# Patient Record
Sex: FEMALE | Race: WHITE | NOT HISPANIC OR LATINO | Employment: OTHER | URBAN - METROPOLITAN AREA
[De-identification: names, ages, dates, MRNs, and addresses within clinical notes are randomized per-mention and may not be internally consistent; named-entity substitution may affect disease eponyms.]

---

## 2018-07-23 ENCOUNTER — HISTORICAL (OUTPATIENT)
Dept: ADMINISTRATIVE | Facility: HOSPITAL | Age: 73
End: 2018-07-23

## 2018-10-31 ENCOUNTER — HISTORICAL (OUTPATIENT)
Dept: RADIOLOGY | Facility: HOSPITAL | Age: 73
End: 2018-10-31

## 2018-11-08 LAB
BUN SERPL-MCNC: 16 MG/DL (ref 7–18)
CALCIUM SERPL-MCNC: 8.9 MG/DL (ref 8.5–10.1)
CHLORIDE SERPL-SCNC: 106 MMOL/L (ref 98–107)
CO2 SERPL-SCNC: 30 MMOL/L (ref 21–32)
CREAT SERPL-MCNC: 0.91 MG/DL (ref 0.55–1.02)
CREAT/UREA NIT SERPL: 18
GLUCOSE SERPL-MCNC: 137 MG/DL (ref 74–106)
POTASSIUM SERPL-SCNC: 3.3 MMOL/L (ref 3.5–5.1)
SODIUM SERPL-SCNC: 142 MMOL/L (ref 136–145)

## 2018-11-13 ENCOUNTER — HISTORICAL (OUTPATIENT)
Dept: SURGERY | Facility: HOSPITAL | Age: 73
End: 2018-11-13

## 2019-01-07 ENCOUNTER — HISTORICAL (OUTPATIENT)
Dept: ADMINISTRATIVE | Facility: HOSPITAL | Age: 74
End: 2019-01-07

## 2019-06-17 ENCOUNTER — HISTORICAL (OUTPATIENT)
Dept: RESPIRATORY THERAPY | Facility: HOSPITAL | Age: 74
End: 2019-06-17

## 2019-06-25 ENCOUNTER — HISTORICAL (OUTPATIENT)
Dept: RADIOLOGY | Facility: HOSPITAL | Age: 74
End: 2019-06-25

## 2021-12-07 ENCOUNTER — HISTORICAL (OUTPATIENT)
Dept: ADMINISTRATIVE | Facility: HOSPITAL | Age: 76
End: 2021-12-07

## 2021-12-15 ENCOUNTER — HISTORICAL (OUTPATIENT)
Dept: RADIOLOGY | Facility: HOSPITAL | Age: 76
End: 2021-12-15

## 2022-04-07 ENCOUNTER — HISTORICAL (OUTPATIENT)
Dept: ADMINISTRATIVE | Facility: HOSPITAL | Age: 77
End: 2022-04-07
Payer: MEDICARE

## 2022-04-24 VITALS
WEIGHT: 200 LBS | BODY MASS INDEX: 32.14 KG/M2 | SYSTOLIC BLOOD PRESSURE: 148 MMHG | DIASTOLIC BLOOD PRESSURE: 88 MMHG | HEIGHT: 66 IN | OXYGEN SATURATION: 98 %

## 2022-04-27 RX ORDER — AMLODIPINE BESYLATE 5 MG/1
5 TABLET ORAL DAILY
Status: ON HOLD | COMMUNITY
Start: 2021-12-07 | End: 2022-05-03 | Stop reason: ALTCHOICE

## 2022-04-27 RX ORDER — OMEPRAZOLE 40 MG/1
40 CAPSULE, DELAYED RELEASE ORAL DAILY
COMMUNITY

## 2022-04-27 RX ORDER — ROSUVASTATIN CALCIUM 40 MG/1
10 TABLET, COATED ORAL NIGHTLY
COMMUNITY

## 2022-04-28 NOTE — OP NOTE
Patient:   Minda Bright            MRN: [id number]            FIN: [id number]               Age:   73 years     Sex:  Female     :  1945   Associated Diagnoses:   None   Author:   Donald TORRES MD, Giles JONES      SURGEON: Giles Bennett MD    PREOPERATIVE DIAGNOSIS: Right shoulder rotator cuff tear, SLAP tear, Impingement  POSTOPERATIVE DIAGNOSIS: Right shoulder rotator cuff tear, SLAP tear, Impingement    PROCEDURE PERFORMED:   1. Right shoulder arthroscopic rotator cuff repair  2. Right shoulder arthroscopic biceps tenotomy  3. Right shoulder arthroscopic subacromial decompression  4. Right shoulder PRP injection    ANESTHESIA: General plus regional    ESTIMATED BLOOD LOSS: Minimal.    COMPLICATIONS: None.    IMPLANTS:    1. Medial Row: Arthrex SwiveLock 4.75mm Anchors   2. Lateral Row: Arthrex SwiveLock 5.5mm Anchors     INDICATIONS FOR PROCEDURE: Minda is a 73 y.o. female who has had ongoing right shoulder pain and weakness. The patient was seen in the clinic and preoperative imaging has revealed a torn rotator cuff. The patient has failed nonoperative treatment and has elected for operative intervention. Risks and benefits were thoroughly explained and consent was obtained prior to today's procedure.    DESCRIPTION OF PROCEDURE: The patient was seen in the preoperative holding area where the history and physical were reviewed without change. The operative shoulder was marked, consents were reviewed, and any questions were answered for the patient. A regional blockade of the shoulder was performed by the Anesthesia Service. The patient was induced under general anesthesia. Next the patient was placed upright into the beachchair position with care taken to ensure the cervical spine was well positioned and the face, eyes and all bony prominences well protected. Preoperative time-out led by the surgeon was performed verifying the patient, procedure, and preoperative antibiotics. The right upper  extremity was then prepped and draped in the usual sterile fashion and secured to an arm britt.    A standard posterior portal was established with a #11-blade.  The arthroscope was inserted into the glenohumeral joint atraumatically. The joint was insufflated with arthroscopic fluid. We then made a standard anterior portal using an #18-gauge spinal needle for guidance. An arthroscopic probe was inserted through the anterior portal and diagnostic arthroscopy begun.    This revealed a intact biceps tendon with longitudinal tearing. A torn superior labrum. An intact anterior, inferior and posterior labrum. The articular cartilage of the humeral head was intact. The articular cartilage of the glenoid was intact. The subscapularis tendon was intact. The articular side of the supraspinatus tendon was torn. The articular side of the infraspinatus tendon was intact.    We then used our arthroscopic punch to perform a tenotomy of the biceps tendon. We used arthroscopic shaver to debride any remnant biceps from the superior labrum. The biceps tendon was then allowed to retract to the bicipital groove anteriorly.    We identified the full thickness rotator cuff tear which involved the supraspinatus tendons. The tear was of U type pattern, and the quality of the tissue was good. We identified the undersurface of the acromion. We used a VAPR to debride the undersurface of the acromion up to the anterior and lateral margins. We identified the CA ligament and reflected it off the acromion. We continued debridement of the bursal tissue, identified the rotator cuff tear, and worked to define the edges more clearly. The remnants of rotator cuff tissue at the greater tuberosity was debrided and the greater tuberosity was debrided down to bleeding bone. We used the liberator VAPR to release adhesions both superiorly and inferiorly above and below the rotator cuff. After we had done this, it was able to mobilize the quite well. We  began the repair once we had our tissue appropriately mobilized. We placed a 2 anchors adjacent to the articular surface. We passed suture tapes anterior and posterior through the torn tendon. I then split the suture tape loop into 2 seperate strands.     Next, we began debridement of the lateral humerus with a VAPR probe. We debrided down to bony base. At this time, we then brought out 1 suture from each medial tape into 2 lateral row anchors. These were brought down tightly and had excellent purchase of the lateral cortex. We were very satisfied with the repair.    Next, we did our acromioplasty by burring the anterolateral margin of the acromion then working carefully medially. The portals were switched and the acromioplasty was complated through the posterior portal in the cutting block fashion.     Under arthroscopic visualization, I then placed a spinal needle into the tendon bone repair interface.  I injected 4 cc of PRP into the area.    Once we completed this, we took the remaining final arthroscopic pictures. We then removed our instruments, closed the portals with #3-0 monocryl suture. Sterile dressings were applied. The patient was then placed in an abduction pillow and sling, awoken from general anesthetic, and taken to recovery room in a stable condition.

## 2022-05-03 ENCOUNTER — HOSPITAL ENCOUNTER (OUTPATIENT)
Facility: HOSPITAL | Age: 77
Discharge: HOME | End: 2022-05-03
Attending: INTERNAL MEDICINE | Admitting: INTERNAL MEDICINE
Payer: MEDICARE

## 2022-05-03 VITALS
HEIGHT: 66 IN | BODY MASS INDEX: 30.36 KG/M2 | HEART RATE: 71 BPM | OXYGEN SATURATION: 98 % | DIASTOLIC BLOOD PRESSURE: 86 MMHG | WEIGHT: 188.94 LBS | TEMPERATURE: 98 F | SYSTOLIC BLOOD PRESSURE: 156 MMHG

## 2022-05-03 DIAGNOSIS — R07.9 CHEST PAIN: ICD-10-CM

## 2022-05-03 DIAGNOSIS — R93.1 ABNORMAL ECHOCARDIOGRAM: ICD-10-CM

## 2022-05-03 DIAGNOSIS — R06.02 SHORTNESS OF BREATH: ICD-10-CM

## 2022-05-03 DIAGNOSIS — R94.8 NONSPECIFIC ABNORMAL RESULTS OF BASAL METABOLISM FUNCTION STUDY: ICD-10-CM

## 2022-05-03 PROCEDURE — C1769 GUIDE WIRE: HCPCS | Performed by: INTERNAL MEDICINE

## 2022-05-03 PROCEDURE — 93005 ELECTROCARDIOGRAM TRACING: CPT

## 2022-05-03 PROCEDURE — 93458 L HRT ARTERY/VENTRICLE ANGIO: CPT | Performed by: INTERNAL MEDICINE

## 2022-05-03 PROCEDURE — 93010 EKG 12-LEAD: ICD-10-PCS | Mod: XE,,, | Performed by: INTERNAL MEDICINE

## 2022-05-03 PROCEDURE — 63600175 PHARM REV CODE 636 W HCPCS: Performed by: INTERNAL MEDICINE

## 2022-05-03 PROCEDURE — 25000003 PHARM REV CODE 250: Performed by: INTERNAL MEDICINE

## 2022-05-03 PROCEDURE — 27201423 OPTIME MED/SURG SUP & DEVICES STERILE SUPPLY: Performed by: INTERNAL MEDICINE

## 2022-05-03 PROCEDURE — C1894 INTRO/SHEATH, NON-LASER: HCPCS | Performed by: INTERNAL MEDICINE

## 2022-05-03 PROCEDURE — 25000003 PHARM REV CODE 250

## 2022-05-03 PROCEDURE — 93010 ELECTROCARDIOGRAM REPORT: CPT | Mod: XE,,, | Performed by: INTERNAL MEDICINE

## 2022-05-03 PROCEDURE — C1887 CATHETER, GUIDING: HCPCS | Performed by: INTERNAL MEDICINE

## 2022-05-03 RX ORDER — CLOPIDOGREL 300 MG/1
300 TABLET, FILM COATED ORAL ONCE
COMMUNITY
End: 2022-05-03

## 2022-05-03 RX ORDER — HEPARIN SODIUM 1000 [USP'U]/ML
INJECTION, SOLUTION INTRAVENOUS; SUBCUTANEOUS
Status: DISCONTINUED | OUTPATIENT
Start: 2022-05-03 | End: 2022-05-03 | Stop reason: HOSPADM

## 2022-05-03 RX ORDER — FENTANYL CITRATE 50 UG/ML
INJECTION, SOLUTION INTRAMUSCULAR; INTRAVENOUS
Status: DISCONTINUED | OUTPATIENT
Start: 2022-05-03 | End: 2022-05-03 | Stop reason: HOSPADM

## 2022-05-03 RX ORDER — ACETAMINOPHEN 325 MG/1
650 TABLET ORAL EVERY 4 HOURS PRN
Status: DISCONTINUED | OUTPATIENT
Start: 2022-05-03 | End: 2022-05-03 | Stop reason: HOSPADM

## 2022-05-03 RX ORDER — SODIUM CHLORIDE 0.9 % (FLUSH) 0.9 %
10 SYRINGE (ML) INJECTION
Status: ACTIVE | OUTPATIENT
Start: 2022-05-03

## 2022-05-03 RX ORDER — DIAZEPAM 5 MG/1
TABLET ORAL
Status: COMPLETED
Start: 2022-05-03 | End: 2022-05-03

## 2022-05-03 RX ORDER — VERAPAMIL HYDROCHLORIDE 2.5 MG/ML
INJECTION, SOLUTION INTRAVENOUS
Status: DISCONTINUED | OUTPATIENT
Start: 2022-05-03 | End: 2022-05-03 | Stop reason: HOSPADM

## 2022-05-03 RX ORDER — DIAZEPAM 5 MG/1
10 TABLET ORAL
Status: DISCONTINUED | OUTPATIENT
Start: 2022-05-03 | End: 2022-05-03 | Stop reason: HOSPADM

## 2022-05-03 RX ORDER — DIAZEPAM 5 MG/1
10 TABLET ORAL ONCE
Status: DISCONTINUED | OUTPATIENT
Start: 2022-05-03 | End: 2022-05-03

## 2022-05-03 RX ORDER — SODIUM CHLORIDE 9 MG/ML
INJECTION, SOLUTION INTRAVENOUS ONCE
Status: COMPLETED | OUTPATIENT
Start: 2022-05-03 | End: 2022-05-03

## 2022-05-03 RX ORDER — DIPHENHYDRAMINE HCL 25 MG
50 CAPSULE ORAL
Status: DISPENSED | OUTPATIENT
Start: 2022-05-03

## 2022-05-03 RX ORDER — NITROGLYCERIN 20 MG/100ML
INJECTION INTRAVENOUS
Status: DISCONTINUED | OUTPATIENT
Start: 2022-05-03 | End: 2022-05-03 | Stop reason: HOSPADM

## 2022-05-03 RX ORDER — LIDOCAINE HYDROCHLORIDE 20 MG/ML
INJECTION, SOLUTION EPIDURAL; INFILTRATION; INTRACAUDAL; PERINEURAL
Status: DISCONTINUED | OUTPATIENT
Start: 2022-05-03 | End: 2022-05-03 | Stop reason: HOSPADM

## 2022-05-03 RX ORDER — METOPROLOL SUCCINATE 25 MG/1
25 TABLET, EXTENDED RELEASE ORAL NIGHTLY
COMMUNITY
End: 2023-09-25 | Stop reason: DRUGHIGH

## 2022-05-03 RX ORDER — CHOLECALCIFEROL (VITAMIN D3) 25 MCG
1000 TABLET ORAL NIGHTLY
COMMUNITY

## 2022-05-03 RX ORDER — MULTIVITAMIN
1 TABLET ORAL DAILY
COMMUNITY

## 2022-05-03 RX ORDER — MIDAZOLAM HYDROCHLORIDE 2 MG/2ML
INJECTION, SOLUTION INTRAMUSCULAR; INTRAVENOUS
Status: DISCONTINUED | OUTPATIENT
Start: 2022-05-03 | End: 2022-05-03 | Stop reason: HOSPADM

## 2022-05-03 RX ORDER — ASPIRIN 81 MG/1
81 TABLET ORAL DAILY
COMMUNITY

## 2022-05-03 RX ORDER — ASCORBIC ACID 500 MG
500 TABLET ORAL NIGHTLY
COMMUNITY

## 2022-05-03 RX ORDER — VITAMIN E 268 MG
400 CAPSULE ORAL DAILY
COMMUNITY

## 2022-05-03 RX ORDER — ONDANSETRON 4 MG/1
8 TABLET, ORALLY DISINTEGRATING ORAL EVERY 8 HOURS PRN
Status: DISCONTINUED | OUTPATIENT
Start: 2022-05-03 | End: 2022-05-03 | Stop reason: HOSPADM

## 2022-05-03 RX ADMIN — DIAZEPAM 10 MG: 5 TABLET ORAL at 01:05

## 2022-05-03 RX ADMIN — SODIUM CHLORIDE: 9 INJECTION, SOLUTION INTRAVENOUS at 01:05

## 2022-05-03 RX ADMIN — DIPHENHYDRAMINE HYDROCHLORIDE 50 MG: 25 CAPSULE ORAL at 01:05

## 2022-05-03 NOTE — Clinical Note
The DP pulses were 1+ bilaterally. The left radial pulse was 2+. The right radial pulse was +2 and allens test positive.

## 2022-05-03 NOTE — PROGRESS NOTES
Tr band removed; no bleeding noted, no heamtoma noted; quik clot and tegaderm applied and arm board reapplied to right arm

## 2022-05-03 NOTE — Clinical Note
The radial band was applied to the right radial artery. 13 cc's of air were inserted into the closure device.

## 2022-05-03 NOTE — Clinical Note
The catheter was repositioned into the ostium   left main. An angiography was performed of the left coronary arteries.

## 2022-05-03 NOTE — Clinical Note
The groin and right radial was prepped. The site was prepped with ChloraPrep. The site was clipped. The patient was draped. The patient was positioned supine. The patient was secured to an armboard.

## 2022-06-14 NOTE — DISCHARGE SUMMARY
[facility] - Cath Lab Services  Discharge Note  Short Stay    Procedure(s) (LRB):  CATHETERIZATION, HEART, LEFT (Left)    OUTCOME: Patient tolerated treatment/procedure well without complication and is now ready for discharge.    DISPOSITION: Home or Self Care    FINAL DIAGNOSIS:  <principal problem not specified>    FOLLOWUP: In clinic    DISCHARGE INSTRUCTIONS:    Discharge Procedure Orders   Diet general         Clinical Reference Documents Added to Patient Instructions       Document    MODERATE SEDATION IN ADULTS DISCHARGE INSTRUCTIONS (ENGLISH)    WOUND CARE FOR ARTERIAL PUNCTURE DISCHARGE INSTRUCTIONS (ENGLISH)          TIME SPENT ON DISCHARGE: 30 minutes

## 2023-09-25 ENCOUNTER — HOSPITAL ENCOUNTER (OUTPATIENT)
Facility: HOSPITAL | Age: 78
Discharge: HOME HEALTH | End: 2023-09-26
Attending: STUDENT IN AN ORGANIZED HEALTH CARE EDUCATION/TRAINING PROGRAM | Admitting: STUDENT IN AN ORGANIZED HEALTH CARE EDUCATION/TRAINING PROGRAM
Payer: MEDICARE

## 2023-09-25 DIAGNOSIS — H53.9 TRANSIENT VISUAL DISTURBANCE, RIGHT: Primary | ICD-10-CM

## 2023-09-25 DIAGNOSIS — G45.9 TIA (TRANSIENT ISCHEMIC ATTACK): ICD-10-CM

## 2023-09-25 LAB
ALBUMIN SERPL-MCNC: 3.8 G/DL (ref 3.4–4.8)
ALBUMIN/GLOB SERPL: 1.2 RATIO (ref 1.1–2)
ALP SERPL-CCNC: 65 UNIT/L (ref 40–150)
ALT SERPL-CCNC: 9 UNIT/L (ref 0–55)
AST SERPL-CCNC: 21 UNIT/L (ref 5–34)
BASOPHILS # BLD AUTO: 0.03 X10(3)/MCL
BASOPHILS NFR BLD AUTO: 0.8 %
BILIRUB SERPL-MCNC: 0.5 MG/DL
BUN SERPL-MCNC: 10.9 MG/DL (ref 9.8–20.1)
CALCIUM SERPL-MCNC: 9.7 MG/DL (ref 8.4–10.2)
CHLORIDE SERPL-SCNC: 105 MMOL/L (ref 98–107)
CHOLEST SERPL-MCNC: 139 MG/DL
CHOLEST/HDLC SERPL: 3 {RATIO} (ref 0–5)
CO2 SERPL-SCNC: 29 MMOL/L (ref 23–31)
CREAT SERPL-MCNC: 0.82 MG/DL (ref 0.55–1.02)
EOSINOPHIL # BLD AUTO: 0.27 X10(3)/MCL (ref 0–0.9)
EOSINOPHIL NFR BLD AUTO: 7.2 %
ERYTHROCYTE [DISTWIDTH] IN BLOOD BY AUTOMATED COUNT: 13.1 % (ref 11.5–17)
GFR SERPLBLD CREATININE-BSD FMLA CKD-EPI: >60 MLS/MIN/1.73/M2
GLOBULIN SER-MCNC: 3.1 GM/DL (ref 2.4–3.5)
GLUCOSE SERPL-MCNC: 121 MG/DL (ref 82–115)
HCT VFR BLD AUTO: 38.4 % (ref 37–47)
HDLC SERPL-MCNC: 51 MG/DL (ref 35–60)
HGB BLD-MCNC: 12.5 G/DL (ref 12–16)
IMM GRANULOCYTES # BLD AUTO: 0.01 X10(3)/MCL (ref 0–0.04)
IMM GRANULOCYTES NFR BLD AUTO: 0.3 %
INR PPP: 1.1
LDLC SERPL CALC-MCNC: 71 MG/DL (ref 50–140)
LYMPHOCYTES # BLD AUTO: 1.06 X10(3)/MCL (ref 0.6–4.6)
LYMPHOCYTES NFR BLD AUTO: 28.3 %
MCH RBC QN AUTO: 28.9 PG (ref 27–31)
MCHC RBC AUTO-ENTMCNC: 32.6 G/DL (ref 33–36)
MCV RBC AUTO: 88.9 FL (ref 80–94)
MONOCYTES # BLD AUTO: 0.3 X10(3)/MCL (ref 0.1–1.3)
MONOCYTES NFR BLD AUTO: 8 %
NEUTROPHILS # BLD AUTO: 2.07 X10(3)/MCL (ref 2.1–9.2)
NEUTROPHILS NFR BLD AUTO: 55.4 %
NRBC BLD AUTO-RTO: 0 %
PLATELET # BLD AUTO: 183 X10(3)/MCL (ref 130–400)
PMV BLD AUTO: 11.9 FL (ref 7.4–10.4)
POCT GLUCOSE: 120 MG/DL (ref 70–110)
POTASSIUM SERPL-SCNC: 3.3 MMOL/L (ref 3.5–5.1)
PROT SERPL-MCNC: 6.9 GM/DL (ref 5.8–7.6)
PROTHROMBIN TIME: 13.8 SECONDS (ref 12.5–14.5)
RBC # BLD AUTO: 4.32 X10(6)/MCL (ref 4.2–5.4)
SODIUM SERPL-SCNC: 143 MMOL/L (ref 136–145)
TRIGL SERPL-MCNC: 86 MG/DL (ref 37–140)
TSH SERPL-ACNC: 3.89 UIU/ML (ref 0.35–4.94)
VLDLC SERPL CALC-MCNC: 17 MG/DL
WBC # SPEC AUTO: 3.74 X10(3)/MCL (ref 4.5–11.5)

## 2023-09-25 PROCEDURE — 84443 ASSAY THYROID STIM HORMONE: CPT

## 2023-09-25 PROCEDURE — 25000003 PHARM REV CODE 250

## 2023-09-25 PROCEDURE — 85025 COMPLETE CBC W/AUTO DIFF WBC: CPT

## 2023-09-25 PROCEDURE — 82962 GLUCOSE BLOOD TEST: CPT

## 2023-09-25 PROCEDURE — 93005 ELECTROCARDIOGRAM TRACING: CPT

## 2023-09-25 PROCEDURE — 93010 EKG 12-LEAD: ICD-10-PCS | Mod: ,,, | Performed by: INTERNAL MEDICINE

## 2023-09-25 PROCEDURE — 93010 ELECTROCARDIOGRAM REPORT: CPT | Mod: ,,, | Performed by: INTERNAL MEDICINE

## 2023-09-25 PROCEDURE — 96365 THER/PROPH/DIAG IV INF INIT: CPT | Mod: 59

## 2023-09-25 PROCEDURE — 80061 LIPID PANEL: CPT

## 2023-09-25 PROCEDURE — G0378 HOSPITAL OBSERVATION PER HR: HCPCS

## 2023-09-25 PROCEDURE — 85610 PROTHROMBIN TIME: CPT

## 2023-09-25 PROCEDURE — 99285 EMERGENCY DEPT VISIT HI MDM: CPT | Mod: 25

## 2023-09-25 PROCEDURE — 25500020 PHARM REV CODE 255: Performed by: STUDENT IN AN ORGANIZED HEALTH CARE EDUCATION/TRAINING PROGRAM

## 2023-09-25 PROCEDURE — 63600175 PHARM REV CODE 636 W HCPCS

## 2023-09-25 PROCEDURE — 80053 COMPREHEN METABOLIC PANEL: CPT

## 2023-09-25 PROCEDURE — 96372 THER/PROPH/DIAG INJ SC/IM: CPT | Mod: 59

## 2023-09-25 RX ORDER — METOPROLOL SUCCINATE 50 MG/1
50 TABLET, EXTENDED RELEASE ORAL DAILY
COMMUNITY
Start: 2023-09-15

## 2023-09-25 RX ORDER — AMLODIPINE BESYLATE 10 MG/1
10 TABLET ORAL DAILY
COMMUNITY
Start: 2023-09-06

## 2023-09-25 RX ORDER — EZETIMIBE 10 MG/1
10 TABLET ORAL DAILY
COMMUNITY
Start: 2023-09-06

## 2023-09-25 RX ORDER — AMLODIPINE BESYLATE 5 MG/1
10 TABLET ORAL DAILY
Status: DISCONTINUED | OUTPATIENT
Start: 2023-09-26 | End: 2023-09-26 | Stop reason: HOSPADM

## 2023-09-25 RX ORDER — LABETALOL HYDROCHLORIDE 5 MG/ML
10 INJECTION, SOLUTION INTRAVENOUS
Status: DISCONTINUED | OUTPATIENT
Start: 2023-09-25 | End: 2023-09-26 | Stop reason: HOSPADM

## 2023-09-25 RX ORDER — SODIUM CHLORIDE 0.9 % (FLUSH) 0.9 %
10 SYRINGE (ML) INJECTION
Status: DISCONTINUED | OUTPATIENT
Start: 2023-09-25 | End: 2023-09-26 | Stop reason: HOSPADM

## 2023-09-25 RX ORDER — POTASSIUM CHLORIDE 7.45 MG/ML
10 INJECTION INTRAVENOUS ONCE
Status: COMPLETED | OUTPATIENT
Start: 2023-09-25 | End: 2023-09-25

## 2023-09-25 RX ORDER — ATORVASTATIN CALCIUM 40 MG/1
40 TABLET, FILM COATED ORAL DAILY
Status: DISCONTINUED | OUTPATIENT
Start: 2023-09-25 | End: 2023-09-26 | Stop reason: HOSPADM

## 2023-09-25 RX ORDER — EZETIMIBE 10 MG/1
10 TABLET ORAL DAILY
Status: DISCONTINUED | OUTPATIENT
Start: 2023-09-26 | End: 2023-09-26 | Stop reason: HOSPADM

## 2023-09-25 RX ORDER — ONDANSETRON 2 MG/ML
4 INJECTION INTRAMUSCULAR; INTRAVENOUS EVERY 8 HOURS PRN
Status: DISCONTINUED | OUTPATIENT
Start: 2023-09-25 | End: 2023-09-26 | Stop reason: HOSPADM

## 2023-09-25 RX ORDER — ASPIRIN 81 MG/1
81 TABLET ORAL DAILY
Status: DISCONTINUED | OUTPATIENT
Start: 2023-09-25 | End: 2023-09-26 | Stop reason: HOSPADM

## 2023-09-25 RX ORDER — METOPROLOL SUCCINATE 50 MG/1
50 TABLET, EXTENDED RELEASE ORAL DAILY
Status: DISCONTINUED | OUTPATIENT
Start: 2023-09-26 | End: 2023-09-26 | Stop reason: HOSPADM

## 2023-09-25 RX ORDER — ENOXAPARIN SODIUM 100 MG/ML
40 INJECTION SUBCUTANEOUS EVERY 24 HOURS
Status: DISCONTINUED | OUTPATIENT
Start: 2023-09-25 | End: 2023-09-26 | Stop reason: HOSPADM

## 2023-09-25 RX ADMIN — POTASSIUM CHLORIDE 10 MEQ: 10 INJECTION, SOLUTION INTRAVENOUS at 05:09

## 2023-09-25 RX ADMIN — ATORVASTATIN CALCIUM 40 MG: 40 TABLET, FILM COATED ORAL at 05:09

## 2023-09-25 RX ADMIN — ASPIRIN 81 MG: 81 TABLET, COATED ORAL at 05:09

## 2023-09-25 RX ADMIN — IOPAMIDOL 100 ML: 755 INJECTION, SOLUTION INTRAVENOUS at 05:09

## 2023-09-25 RX ADMIN — ENOXAPARIN SODIUM 40 MG: 40 INJECTION SUBCUTANEOUS at 05:09

## 2023-09-25 NOTE — DISCHARGE INSTRUCTIONS
Thanks for letting us take care of you today!  It is our goal to give you courteous care and to keep you comfortable and informed, if you have any questions before you leave I will be happy to try and answer them.    Here is some advice after your visit:    Your visit in the emergency department is NOT definitive care - please follow-up with your primary care doctor and/or specialist within 1-2 days. Please return to the emergency department if you develop worsening symptoms including: fever, chills, chest pain, shortness of breath, weakness, numbness, tingling, nausea, vomiting, inability to eat, drink, or take your medication. Please return if you have any worsening in your condition or if you have any other concerns.    If you had radiology exams like an XRAY or CT in the emergency Department the interpreation on them may be preliminary - there may be less time sensitive findings on the reports please obtain these reports within 24 hours from the hospital or by using your out on your mobile phone to access records.  Bring these to your primary care doctor and/or specialist for further review of incidental findings.    Please review any LAB WORK from your visit today with your primary care physician.    Please be sure to follow up with the primary care physician.  You can let them know the emergency department he was concerned you possibly had a TIA.  This needs to be worked up more.    Please return emergency department immediately if you have any return of or worsening symptoms.

## 2023-09-25 NOTE — FIRST PROVIDER EVALUATION
Medical screening examination initiated.  I have conducted a focused provider triage encounter, findings are as follows:    Brief history of present illness:  arrived to ED due to R eye blurriness that began around 0830 today. Symptoms have since resolved. Denies hx of CVA.     Vitals:    09/25/23 1102   BP: (!) 171/91   BP Location: Right arm   Pulse: 99   Resp: 19   SpO2: 95%   Weight: 88.6 kg (195 lb 5.2 oz)       Pertinent physical exam:  ambulatory into triage, awake, alert, no slurred speech, facial droop, or extremity weakness noted.     Brief workup plan:  labs, visual acuity, & CT     Preliminary workup initiated; this workup will be continued and followed by the physician or advanced practice provider that is assigned to the patient when roomed.

## 2023-09-25 NOTE — H&P
[facility] Hospital Medicine History & Physical Examination       Patient Name: Minda Bright  MRN: [id number]  Patient Class: OP- Observation   Admission Date: 9/25/2023   Admitting Physician: EVELYN Service   Length of Stay: 0  Attending Physician: Dr. Jameson   Primary Care Provider: Td Bolden MD  Face-to-Face encounter date: 09/25/2023  Code Status:Full   Chief Complaint: Blurred Vision (Pt sent by  for blurry vision in R eye/nausea about 0830 this AM. Blurred vision resolved PTA. No drift, equal strength bilaterally, no facial droop/speech changes. Hx HTN, dose increased recently. Visual acuity charted. ) and Nausea    Patient information was obtained from patient, patient's family, past medical records and ER records.  ED records were reviewed in detail and documented below    HISTORY OF PRESENT ILLNESS:   Minda Bright is a 78 y.o. female who past medical history of HTN and HLD known to [facility] presented to [facility] on 9/25/2023 with a primary complaint of right eye blurry vision and left upper extremity numbness lasting 5 minutes that started this morning at 8:30 a.m. while riding down the road with her .  The patient explains she was scheduled for routine lab work this morning with her PCP and while driving down the road home, she developed the mentioned symptoms.  She states they resolved in about 5 minutes.  She states she called her PCP who recommended ER evaluation.  The patient explains that she does not have a history of TIA, CVA, or MI/cardiac stents.  The patient denies any unilateral weakness, dysarthria, facial asymmetry, dysphagia, or headaches.  Patient also denies any chest pain, shortness a breath, cough, abdominal pain, nausea, vomiting, diarrhea, dysuria, bleeding symptoms, syncope, falls, any injuries.    ED course: Vital signs revealed the patient is afebrile, elevated /91 improved to 150 9/78, other vital signs stable on room air.   Labs with unremarkable CBC, unremarkable PT/INR, chemistry with mildly decreased potassium at 3.3.  Normal TSH.  CT head without contrast showed no acute findings.  EKG with sinus rhythm 81 beats per minute and no STEMI criteria.    Patient admitted for TIA      PAST MEDICAL HISTORY:     Past Medical History:   Diagnosis Date    Anxiety disorder, unspecified     Asthma     COPD (chronic obstructive pulmonary disease)     Hyperlipidemia     Hypertension     Obesity, unspecified        PAST SURGICAL HISTORY:     Past Surgical History:   Procedure Laterality Date    BACK SURGERY      BREAST LUMPECTOMY Right     LEFT HEART CATHETERIZATION Left 5/3/2022    Procedure: CATHETERIZATION, HEART, LEFT;  Surgeon: Jaskaran Kevin MD;  Location: [facility] CATH LAB;  Service: Cardiology;  Laterality: Left;  LHC +/- PCI VIA RRA    ROTATOR CUFF REPAIR Right     TONSILLECTOMY      TOTAL KNEE ARTHROPLASTY Right        ALLERGIES:   Arb-angiotensin receptor antagonist    FAMILY HISTORY:   Reviewed and negative    SOCIAL HISTORY:     Social History     Tobacco Use    Smoking status: Never    Smokeless tobacco: Never   Substance Use Topics    Alcohol use: Never        HOME MEDICATIONS:     Prior to Admission medications    Medication Sig Start Date End Date Taking? Authorizing Provider   amLODIPine (NORVASC) 10 MG tablet Take 10 mg by mouth once daily. 9/6/23  Yes Provider, Historical   ascorbic acid, vitamin C, (VITAMIN C) 500 MG tablet Take 500 mg by mouth every evening.   Yes Provider, Historical   aspirin (ECOTRIN) 81 MG EC tablet Take 81 mg by mouth once daily.   Yes Provider, Historical   ezetimibe (ZETIA) 10 mg tablet Take 10 mg by mouth once daily. 9/6/23  Yes Provider, Historical   metoprolol succinate (TOPROL-XL) 50 MG 24 hr tablet Take 50 mg by mouth once daily. 9/15/23  Yes Provider, Historical   multivitamin (ONE DAILY MULTIVITAMIN) per tablet Take 1 tablet by mouth once daily.   Yes Provider, Historical   omeprazole (PRILOSEC)  40 MG capsule Take 40 mg by mouth once daily.   Yes Provider, Historical   rosuvastatin (CRESTOR) 40 MG Tab Take 10 mg by mouth every evening.   Yes Provider, Historical   vitamin D (VITAMIN D3) 1000 units Tab Take 1,000 Units by mouth every evening.   Yes Provider, Historical   vitamin E 400 UNIT capsule Take 400 Units by mouth once daily.   Yes Provider, Historical   clopidogreL (PLAVIX) 300 mg Tab Take 300 mg by mouth once.  5/3/22  Provider, Historical   metoprolol succinate (TOPROL-XL) 25 MG 24 hr tablet Take 25 mg by mouth every evening.  9/25/23  Provider, Historical       REVIEW OF SYSTEMS:   Except as documented, all other systems reviewed and negative     PHYSICAL EXAM:     VITAL SIGNS: 24 HRS MIN & MAX LAST   No data recorded     BP  Min: 159/78  Max: 171/91 (!) 159/78   Pulse  Min: 88  Max: 99  88   Resp  Min: 18  Max: 19 18   SpO2  Min: 95 %  Max: 96 % 96 %       General appearance: Well-developed, well-nourished female in no apparent distress.  HENT: Atraumatic head. Moist mucous membranes of oral cavity.  Eyes: Normal extraocular movements.   Neck: Supple.   Lungs: Clear to auscultation bilaterally. No wheezing present.  Stable on room air.  Heart: Regular rate and rhythm. S1 and S2 present with no murmurs/gallop/rub. No pedal edema. No JVD present.   Abdomen: Soft, non-distended, non-tender. No rebound tenderness/guarding. Bowel sounds are normal.   Extremities: No cyanosis, clubbing, or edema.  Skin: No Rash.   Neuro: Motor and sensory exams grossly intact. Good tone. Muscle strength 5/5 in all 4 extremities.  Nonfocal exam.  AAO x4.  No unilateral weakness, no pronator drift, no facial asymmetry, no dysarthria.  Sensation is intact distally in all extremities.  Psych/mental status: Appropriate mood and affect. Responds appropriately to questions.     LABS AND IMAGING:     Recent Labs   Lab 09/25/23  1153   WBC 3.74*   RBC 4.32   HGB 12.5   HCT 38.4   MCV 88.9   MCH 28.9   MCHC 32.6*   RDW 13.1       MPV 11.9*       Recent Labs   Lab 09/25/23  1153      K 3.3*   CO2 29   BUN 10.9   CREATININE 0.82   CALCIUM 9.7   ALBUMIN 3.8   ALKPHOS 65   ALT 9   AST 21   BILITOT 0.5       Microbiology Results (last 7 days)       ** No results found for the last 168 hours. **             CT Head Without Contrast  Narrative: EXAMINATION:  CT HEAD WITHOUT CONTRAST    CLINICAL HISTORY:  Neuro deficit, acute, stroke suspected;    TECHNIQUE:  CT images of the head without IV contrast. Axial, coronal and sagittal images reviewed. Dose length product 936 mGycm. Automatic exposure control, adjustment of mA/kV or iterative reconstruction technique used to limit radiation dose.    COMPARISON:  No relevant comparison studies available at the time of dictation.    FINDINGS:  Extra-axial spaces/ventricular system: Normal for age.    Intracranial hemorrhage: None identified.    Cerebral parenchyma: No acute large vessel territory infarct or mass effect identified.    Vascular system: No hyperdense vessel appreciated. Carotid siphon and distal vertebral artery calcifications.    Cerebellum: Normal.    Sella: Normal.    Included paranasal sinuses and mastoid air cells: Mucosal thickening in the maxillary sinuses with small volume retained secretions.  Partial opacification of the ethmoid air cells and sphenoid sinuses.    Visualized orbits: Normal.    Scalp/Calvarium: No depressed skull fracture.  Impression: No acute intracranial process identified.    Electronically signed by: Milan Shipman  Date:    09/25/2023  Time:    11:47        ASSESSMENT & PLAN:     TIA   Hypokalemia, mild  Essential hypertension  Hypercholesterolemia   GERD     -Consider neurology consultation   -Consider cardiology consultation   -Ordered brain MRI  -Order CTA of brain and neck  -Ordered TTE with bubble study   -Continue aspirin and statin   -Order PT/OT/speech consultations   -Resumed appropriate home medications   -Ordered fall  precautions  -Ordered neuro checks   -Ordered potassium supplements  -A.m. labs are CBC, CMP, magnesium, phosphorus, hemoglobin A1c, and lipid panel    VTE Prophylaxis: will be placed on Lovenox for DVT prophylaxis and will be advised to be as mobile as possible and sit in a chair as tolerated    Patient condition:  Stable  __________________________________________________________________________  INPATIENT LIST OF MEDICATIONS     Scheduled Meds:   [START ON 9/26/2023] amLODIPine  10 mg Oral Daily    aspirin  81 mg Oral Daily    atorvastatin  40 mg Oral Daily    enoxparin  40 mg Subcutaneous Daily    [START ON 9/26/2023] ezetimibe  10 mg Oral Daily    [START ON 9/26/2023] metoprolol succinate  50 mg Oral Daily    potassium chloride  10 mEq Intravenous Once     Continuous Infusions:  PRN Meds:.labetalol, ondansetron, sodium chloride 0.9%      IBj have reviewed and discussed the case with Dr. Jmaeson   Please see the following addendum for further assessment and plan from there attending MD.    09/25/2023    ________________________________________________________________________________    MD Addendum:  I, Dr. Jameson assumed care of this patient today.  For the patient encounter, I performed the substantive portion of the visit, I reviewed the NP/PA documentation, treatment plan, and medical decision making.  I had face to face time with this patient      Patient states that she had right eye blurry vision that lasted for seconds but decided to come to the ED.  Blurry vision has since resolved.    She has no other reported concerns or complaints.  No lightheadedness no dizziness, no ear pain.  No recent infections.  Patient has remained afebrile.    Has no known history of visual changes and does not wear eyeglasses or contacts.    She does not smoke cigarettes and are consume any form of tobacco.  She does not use alcohol.    She has no known heart problems and has never had a stroke.    She does  have a history of high cholesterol and hypertension.    She has no mobility deficits no weakness reported no speech deficits associated with this event.    Patient at this time is at baseline.     is present at bedside.       ------------------------------------------------------------------------------------------------------------       General: Appears comfortable, no acute distress.  Integumentary: Warm, dry, intact.  Neuro:  Alert awake oriented.    No conjunctivitis, pupils equal accommodating and reactive to light.      Musculoskeletal: Purposeful movement noted.   Respiratory: No accessory muscle use. Breath sounds are equal.  Cardiovascular: Regular rate. No peripheral edema.           ------------------------------------------------------------------------------------------------------------  Likely TIA but will rule out stroke.  Continue to monitor vitals q.4 hours   Neuro checks q.4 hours.  CT head negative.  Will order MRI to further evaluate.  Continue telemetry  Maintain IV access, provide gentle IV hydration   Provide supplemental oxygen if needed for oxygen saturation less than 90-92%   Monitor blood glucose and avoid hypo/hyperglycemia, sliding scale insulin as needed  Monitor labs as needed-CBC, PT, PTT/INR, cardiac marker   Appropriate management of blood pressure, allow for permissive hypertension in the 1st 24 hours.    Appropriate prevention to be continued/medical optimization-aspirin statin therapy  Early rehabilitation with speech, PT and OT as deemed appropriate/necessary  Okay to eat if passed bedside swallow           All diagnosis and differential diagnosis have been reviewed; assessment and plan has been documented; I have personally reviewed the labs and test results that are presently available; I have reviewed the patients medication list; I have reviewed the consulting providers response and recommendations. I have reviewed or attempted to review medical records based upon  their availability.    All of the patient and family questions have been addressed and answered. Patient's is agreeable to the above stated plan. I will continue to monitor closely and make adjustments to medical management as needed.     Dr. Hannah Jameson DO     I have spent >30 minutes on the day of the visit; time spent includes face to face time and non-face to face time preparing to see the patient (eg, review of tests), independently reviewing and interpreting medical records, both past and current; documenting clinical information in the electronic or other health record, and communicating results to the patient/family/caregiver and care coordinator and nursing team.

## 2023-09-25 NOTE — ED PROVIDER NOTES
"Encounter Date: 9/25/2023    SCRIBE #1 NOTE: I, Daksha Roberts, am scribing for, and in the presence of,  Clay Costa MD. I have scribed the following portions of the note - Other sections scribed: HPI, ROS, PE, MDM.       History     Chief Complaint   Patient presents with    Blurred Vision     Pt sent by  for blurry vision in R eye/nausea about 0830 this AM. Blurred vision resolved PTA. No drift, equal strength bilaterally, no facial droop/speech changes. Hx HTN, dose increased recently. Visual acuity charted.     Nausea     78 year old female with a history of COPD, HTN, and HLD presents to ED complaining of a vision change in her right eye and nausea.  Pt says that she was riding in the car with her  when she noticed it. She says that the vision in her right eye was cloudy, grey, and felt like it was "closing in."  She says the episode lasted about 5 minutes.  She denies any flashes or floaters, weakness, numbness, or tingling.  She also denies any HA, CP, SOB.  On re-evaluation patient does report occasionally she has weakness numbness to her left arm.  Unsure if she had some today.    The history is provided by the patient.     Review of patient's allergies indicates:   Allergen Reactions    Arb-angiotensin receptor antagonist Swelling     Past Medical History:   Diagnosis Date    Anxiety disorder, unspecified     Asthma     COPD (chronic obstructive pulmonary disease)     Hyperlipidemia     Hypertension     Obesity, unspecified      Past Surgical History:   Procedure Laterality Date    BACK SURGERY      BREAST LUMPECTOMY Right     LEFT HEART CATHETERIZATION Left 5/3/2022    Procedure: CATHETERIZATION, HEART, LEFT;  Surgeon: Jaskaran Kevin MD;  Location: [facility] CATH LAB;  Service: Cardiology;  Laterality: Left;  [facility] +/- PCI VIA RRA    ROTATOR CUFF REPAIR Right     TONSILLECTOMY      TOTAL KNEE ARTHROPLASTY Right      No family history on file.  Social History     Tobacco Use    Smoking " status: Never    Smokeless tobacco: Never   Substance Use Topics    Alcohol use: Never    Drug use: Never     Review of Systems   Eyes:  Positive for visual disturbance.   Respiratory:  Negative for shortness of breath.    Cardiovascular:  Negative for chest pain.   Gastrointestinal:  Positive for nausea.   Neurological:  Negative for weakness, numbness and headaches.       Physical Exam     Initial Vitals [09/25/23 1102]   BP Pulse Resp Temp SpO2   (!) 171/91 99 19 -- 95 %      MAP       --         Physical Exam    Nursing note and vitals reviewed.  Constitutional: She appears well-developed and well-nourished. She is not diaphoretic. No distress.   HENT:   Head: Normocephalic and atraumatic.   Right Ear: External ear normal.   Left Ear: External ear normal.   Nose: Nose normal.   Eyes: Conjunctivae and EOM are normal. Pupils are equal, round, and reactive to light. Right eye exhibits no discharge. Left eye exhibits no discharge.   Pressure right eye 15.5.  Pressure left eye 14.7.  Pupils 3 mm - 4 mm.  Painless extraocular movement.  Pterygium R eye 3 o'clock position, not covering pupil, over iris .   Cardiovascular:  Normal rate, regular rhythm, normal heart sounds and intact distal pulses.     Exam reveals no gallop and no friction rub.       No murmur heard.  Pulmonary/Chest: Breath sounds normal. No respiratory distress. She has no wheezes. She has no rhonchi. She has no rales. She exhibits no tenderness.   Abdominal: Abdomen is soft. Bowel sounds are normal. She exhibits no distension and no mass. There is no abdominal tenderness. There is no rebound and no guarding.   Musculoskeletal:         General: No edema. Normal range of motion.     Neurological: She is alert and oriented to person, place, and time. No cranial nerve deficit or sensory deficit.   Cranial nerves 2-12 grossly intact   Skin: Skin is warm and dry. Capillary refill takes less than 2 seconds. No erythema. No pallor.         ED Course    Procedures  Labs Reviewed   COMPREHENSIVE METABOLIC PANEL - Abnormal; Notable for the following components:       Result Value    Potassium Level 3.3 (*)     Glucose Level 121 (*)     All other components within normal limits   CBC WITH DIFFERENTIAL - Abnormal; Notable for the following components:    WBC 3.74 (*)     MCHC 32.6 (*)     MPV 11.9 (*)     Neut # 2.07 (*)     All other components within normal limits   POCT GLUCOSE - Abnormal; Notable for the following components:    POCT Glucose 120 (*)     All other components within normal limits   PROTIME-INR - Normal   TSH - Normal   CBC W/ AUTO DIFFERENTIAL    Narrative:     The following orders were created for panel order CBC W/ AUTO DIFFERENTIAL.  Procedure                               Abnormality         Status                     ---------                               -----------         ------                     CBC with Differential[[id number]]       Abnormal            Final result                 Please view results for these tests on the individual orders.   LIPID PANEL   URINALYSIS, REFLEX TO URINE CULTURE   POCT GLUCOSE, HAND-HELD DEVICE        ECG Results              EKG 12-lead (Final result)  Result time 09/25/23 15:59:18      Final result by Interface, Lab In [facility] (09/25/23 15:59:18)                   Narrative:    Test Reason : H53.9,    Vent. Rate : 081 BPM     Atrial Rate : 081 BPM     P-R Int : 154 ms          QRS Dur : 126 ms      QT Int : 408 ms       P-R-T Axes : 064 023 089 degrees     QTc Int : 473 ms    Normal sinus rhythm  Nonspecific intraventricular block  Minimal voltage criteria for LVH, may be normal variant ( Patrick product )  Nonspecific T wave abnormality  Abnormal ECG  Confirmed by Kuldip SHEIKH, Caity ([id number]) on 9/25/2023 3:59:06 PM    Referred By: GRISEL   SELF           Confirmed By:Caity Christiansen MD                                  Imaging Results              CTA Head and Neck (xpd) (Final result)  Result time  09/25/23 17:59:47      Final result by Rhonda Granados MD (09/25/23 17:59:47)                   Impression:      1. No large vessel occlusion.  2. Multifocal narrowing of the left posterior cerebral artery.      Electronically signed by: Rhonda Granados  Date:    09/25/2023  Time:    17:59               Narrative:    EXAMINATION:  CTA HEAD AND NECK (XPD)    CLINICAL HISTORY:  Neuro deficit, acute, stroke suspected;    TECHNIQUE:  Axial images obtained through the cervical region and [place] of [place] before and after the administration of intravenous contrast.    Coronal, sagittal, MIP and 3D reconstructions were obtained from the axial data.    Automatic exposure control was utilized to limit radiation dose.    Radiation Dose:    Total DLP: 1547 mGy*cm    COMPARISON:  CT head dated 09/25/2023    FINDINGS:  Head CT with contrast:    No interval changes when compared to the previous CT.    No enhancing abnormalities.    If present, stenosis of the carotid bulbs is measured based on NASCET criteria,    i.e. area of maximal stenosis compared to the cervical ICA distal to the bulb.    Cervical CTA:    The origins of the great vessels are patent with mild to moderate scattered calcifications.    The common carotid arteries are patent.  There are calcifications at the carotid bulbs with less than 20% stenosis by NASCET criteria.  The internal carotid arteries are patent.    The vertebral arteries are patent.    Intracranial CTA:    There are calcifications along the course of the carotid siphons without hemodynamically significant stenosis.  The middle cerebral arteries and anterior cerebral arteries are patent.    The right vertebral artery is hypoplastic.  The left vertebral artery, basilar artery and right posterior cerebral artery are patent, with multifocal narrowing of the left posterior cerebral artery.    The dural venous sinuses are patent.                                       CT Head Without Contrast  (Final result)  Result time 09/25/23 11:47:50      Final result by Milan Shipman MD (09/25/23 11:47:50)                   Impression:      No acute intracranial process identified.      Electronically signed by: Milan Shipman  Date:    09/25/2023  Time:    11:47               Narrative:    EXAMINATION:  CT HEAD WITHOUT CONTRAST    CLINICAL HISTORY:  Neuro deficit, acute, stroke suspected;    TECHNIQUE:  CT images of the head without IV contrast. Axial, coronal and sagittal images reviewed. Dose length product 936 mGycm. Automatic exposure control, adjustment of mA/kV or iterative reconstruction technique used to limit radiation dose.    COMPARISON:  No relevant comparison studies available at the time of dictation.    FINDINGS:  Extra-axial spaces/ventricular system: Normal for age.    Intracranial hemorrhage: None identified.    Cerebral parenchyma: No acute large vessel territory infarct or mass effect identified.    Vascular system: No hyperdense vessel appreciated. Carotid siphon and distal vertebral artery calcifications.    Cerebellum: Normal.    Sella: Normal.    Included paranasal sinuses and mastoid air cells: Mucosal thickening in the maxillary sinuses with small volume retained secretions.  Partial opacification of the ethmoid air cells and sphenoid sinuses.    Visualized orbits: Normal.    Scalp/Calvarium: No depressed skull fracture.                                       Medications   sodium chloride 0.9% flush 10 mL (has no administration in time range)   labetaloL injection 10 mg (has no administration in time range)   enoxaparin injection 40 mg (40 mg Subcutaneous Given 9/25/23 1739)   ondansetron injection 4 mg (has no administration in time range)   atorvastatin tablet 40 mg (40 mg Oral Given 9/25/23 1739)   aspirin EC tablet 81 mg (81 mg Oral Given 9/25/23 1739)   amLODIPine tablet 10 mg (has no administration in time range)   ezetimibe tablet 10 mg (has no administration in time range)    metoprolol succinate (TOPROL-XL) 24 hr tablet 50 mg (has no administration in time range)   potassium chloride 10 mEq in 100 mL IVPB (0 mEq Intravenous Stopped 9/25/23 1833)   iopamidoL (ISOVUE-370) injection 100 mL (100 mLs Intravenous Given 9/25/23 1733)     Medical Decision Making  Patient presents with transient vision changes    Differential diagnosis includes, but is not limited to CVA, TIA, AMS, retinal hemorrhage, retinal detachment, hypoglycemia    Patient is awake alert.  She denies any complaints at this time.  She says she had some transient color changes, visual disturbances for proximally 5 minutes to the right eye only.  She deny any weakness and this time but does report that sometimes she has some left upper extremity weakness.  Her labs are unrevealing.  Her CT head is negative.  Given her age and lack of workup in the past I am concerned she may have suffered from a TIA or possibly a small clot to result in some retinal artery or vein occlusion.  Her pressure is normal enteritis.  She was full extraocular movement.  She has no eye pain or headache.  No chest pain.  EKG shows sinus rhythm.  Offered admission for further evaluation management with concern for TIA however patient initially declined.  She does change her mind prior to leave in the emergency department we will admit for further evaluation.    Amount and/or Complexity of Data Reviewed  External Data Reviewed: notes.     Details: Patient has paperwork from [facility] that shows that she has close appointments with [facility] for a stress test, echo, further evaluation.  Labs: ordered. Decision-making details documented in ED Course.  Radiology: ordered and independent interpretation performed. Decision-making details documented in ED Course.  ECG/medicine tests: ordered and independent interpretation performed. Decision-making details documented in ED Course.    Risk  Decision regarding hospitalization.            Scribe Attestation:   Scribe #1: I  performed the above scribed service and the documentation accurately describes the services I performed. I attest to the accuracy of the note.    Attending Attestation:           Physician Attestation for Scribe:  Physician Attestation Statement for Scribe #1: I, Clay Costa MD, reviewed documentation, as scribed by Daksha Roberts in my presence, and it is both accurate and complete.             ED Course as of 09/25/23 2057   Mon Sep 25, 2023   1516 ABCD 2 Score for TIA 9/25/2023    RESULT SUMMARY:  2 points  Per the validation study, 0-3 points: Low Risk  2-Day Stroke Risk: 1.0%  7-Day Stroke Risk: 1.2%  90-Day Stroke Risk: 3.1%    INPUTS:  Age = 59 years -> 1 = Yes  BP = 140/90 mmHg -> 1 = Yes  Clinical features of the TIA -> 0 = Other symptoms  Duration of symptoms -> 0 = <10 minutes  History of diabetes -> 0 = No   [MM]   1539 EKG from 1522 shows sinus rhythm rate of 81.  .  Normal axis.  No ST elevation or depression.  Some high voltage QRS complexes  [MM]   2055 CBC W/ AUTO DIFFERENTIAL(!)  No leukocytosis no anemia [MM]   2055 Comprehensive metabolic panel(!)  Abnormalities or renal dysfunction. [MM]   2055 POCT Glucose(!): 120 [MM]      ED Course User Index  [MM] Clay Costa MD                      Clinical Impression:   Final diagnoses:  [H53.9] Transient visual disturbance, right (Primary)        ED Disposition Condition    Observation                 Clay Costa MD  09/25/23 2057

## 2023-09-26 VITALS
WEIGHT: 196.5 LBS | TEMPERATURE: 99 F | HEIGHT: 66 IN | HEART RATE: 88 BPM | OXYGEN SATURATION: 94 % | RESPIRATION RATE: 18 BRPM | DIASTOLIC BLOOD PRESSURE: 80 MMHG | BODY MASS INDEX: 31.58 KG/M2 | SYSTOLIC BLOOD PRESSURE: 125 MMHG

## 2023-09-26 PROBLEM — G45.9 TIA (TRANSIENT ISCHEMIC ATTACK): Status: ACTIVE | Noted: 2023-09-26

## 2023-09-26 LAB
ALBUMIN SERPL-MCNC: 3.6 G/DL (ref 3.4–4.8)
ALBUMIN/GLOB SERPL: 1.5 RATIO (ref 1.1–2)
ALP SERPL-CCNC: 62 UNIT/L (ref 40–150)
ALT SERPL-CCNC: 10 UNIT/L (ref 0–55)
APPEARANCE UR: CLEAR
APTT PPP: 40.7 SECONDS (ref 23.2–33.7)
AST SERPL-CCNC: 21 UNIT/L (ref 5–34)
AV INDEX (PROSTH): 0.48
AV MEAN GRADIENT: 7 MMHG
AV PEAK GRADIENT: 13 MMHG
AV VALVE AREA BY VELOCITY RATIO: 1.55 CM²
AV VALVE AREA: 1.49 CM²
AV VELOCITY RATIO: 0.49
BACTERIA #/AREA URNS AUTO: ABNORMAL /HPF
BASOPHILS # BLD AUTO: 0.02 X10(3)/MCL
BASOPHILS NFR BLD AUTO: 0.5 %
BILIRUB SERPL-MCNC: 0.6 MG/DL
BILIRUB UR QL STRIP.AUTO: NEGATIVE
BSA FOR ECHO PROCEDURE: 2.04 M2
BUN SERPL-MCNC: 10.1 MG/DL (ref 9.8–20.1)
CALCIUM SERPL-MCNC: 9.2 MG/DL (ref 8.4–10.2)
CHLORIDE SERPL-SCNC: 108 MMOL/L (ref 98–107)
CK MB SERPL-MCNC: 2.5 NG/ML
CO2 SERPL-SCNC: 28 MMOL/L (ref 23–31)
COLOR UR AUTO: YELLOW
CREAT SERPL-MCNC: 0.73 MG/DL (ref 0.55–1.02)
CV ECHO LV RWT: 0.5 CM
DOP CALC AO PEAK VEL: 1.78 M/S
DOP CALC AO VTI: 34.7 CM
DOP CALC LVOT AREA: 3.1 CM2
DOP CALC LVOT DIAMETER: 2 CM
DOP CALC LVOT PEAK VEL: 0.88 M/S
DOP CALC LVOT STROKE VOLUME: 51.81 CM3
DOP CALC MV VTI: 33.9 CM
DOP CALCLVOT PEAK VEL VTI: 16.5 CM
E WAVE DECELERATION TIME: 116 MSEC
E/A RATIO: 0.7
E/E' RATIO: 28.22 M/S
ECHO LV POSTERIOR WALL: 1.3 CM (ref 0.6–1.1)
EOSINOPHIL # BLD AUTO: 0.22 X10(3)/MCL (ref 0–0.9)
EOSINOPHIL NFR BLD AUTO: 5.7 %
ERYTHROCYTE [DISTWIDTH] IN BLOOD BY AUTOMATED COUNT: 13.1 % (ref 11.5–17)
EST. AVERAGE GLUCOSE BLD GHB EST-MCNC: 102.5 MG/DL
FRACTIONAL SHORTENING: 21 % (ref 28–44)
GFR SERPLBLD CREATININE-BSD FMLA CKD-EPI: >60 MLS/MIN/1.73/M2
GLOBULIN SER-MCNC: 2.4 GM/DL (ref 2.4–3.5)
GLUCOSE SERPL-MCNC: 97 MG/DL (ref 82–115)
GLUCOSE UR QL STRIP.AUTO: NEGATIVE
HBA1C MFR BLD: 5.2 %
HCT VFR BLD AUTO: 35.1 % (ref 37–47)
HGB BLD-MCNC: 11.5 G/DL (ref 12–16)
IMM GRANULOCYTES # BLD AUTO: 0.02 X10(3)/MCL (ref 0–0.04)
IMM GRANULOCYTES NFR BLD AUTO: 0.5 %
INR PPP: 1.1
INTERVENTRICULAR SEPTUM: 1.2 CM (ref 0.6–1.1)
KETONES UR QL STRIP.AUTO: NEGATIVE
LEFT ATRIUM SIZE: 3.6 CM
LEFT ATRIUM VOLUME INDEX MOD: 18.3 ML/M2
LEFT ATRIUM VOLUME MOD: 36.4 CM3
LEFT INTERNAL DIMENSION IN SYSTOLE: 4.1 CM (ref 2.1–4)
LEFT VENTRICLE DIASTOLIC VOLUME INDEX: 65.33 ML/M2
LEFT VENTRICLE DIASTOLIC VOLUME: 130 ML
LEFT VENTRICLE MASS INDEX: 132 G/M2
LEFT VENTRICLE SYSTOLIC VOLUME INDEX: 37.3 ML/M2
LEFT VENTRICLE SYSTOLIC VOLUME: 74.2 ML
LEFT VENTRICULAR INTERNAL DIMENSION IN DIASTOLE: 5.2 CM (ref 3.5–6)
LEFT VENTRICULAR MASS: 263.45 G
LEUKOCYTE ESTERASE UR QL STRIP.AUTO: ABNORMAL
LV LATERAL E/E' RATIO: 25.4 M/S
LV SEPTAL E/E' RATIO: 31.75 M/S
LVOT MG: 2 MMHG
LVOT MV: 0.59 CM/S
LYMPHOCYTES # BLD AUTO: 1.29 X10(3)/MCL (ref 0.6–4.6)
LYMPHOCYTES NFR BLD AUTO: 33.4 %
MAGNESIUM SERPL-MCNC: 2.2 MG/DL (ref 1.6–2.6)
MCH RBC QN AUTO: 28.8 PG (ref 27–31)
MCHC RBC AUTO-ENTMCNC: 32.8 G/DL (ref 33–36)
MCV RBC AUTO: 87.8 FL (ref 80–94)
MONOCYTES # BLD AUTO: 0.4 X10(3)/MCL (ref 0.1–1.3)
MONOCYTES NFR BLD AUTO: 10.4 %
MV MEAN GRADIENT: 6 MMHG
MV PEAK A VEL: 1.81 M/S
MV PEAK E VEL: 1.27 M/S
MV PEAK GRADIENT: 12 MMHG
MV VALVE AREA BY CONTINUITY EQUATION: 1.53 CM2
NEUTROPHILS # BLD AUTO: 1.91 X10(3)/MCL (ref 2.1–9.2)
NEUTROPHILS NFR BLD AUTO: 49.5 %
NITRITE UR QL STRIP.AUTO: NEGATIVE
NRBC BLD AUTO-RTO: 0 %
OHS LV EJECTION FRACTION SIMPSONS BIPLANE MOD: 57 %
PH UR STRIP.AUTO: 7 [PH]
PHOSPHATE SERPL-MCNC: 3.1 MG/DL (ref 2.3–4.7)
PISA TR MAX VEL: 2.5 M/S
PLATELET # BLD AUTO: 148 X10(3)/MCL (ref 130–400)
PMV BLD AUTO: 11.8 FL (ref 7.4–10.4)
POCT GLUCOSE: 81 MG/DL (ref 70–110)
POTASSIUM SERPL-SCNC: 3.4 MMOL/L (ref 3.5–5.1)
PROT SERPL-MCNC: 6 GM/DL (ref 5.8–7.6)
PROT UR QL STRIP.AUTO: NEGATIVE
PROTHROMBIN TIME: 14 SECONDS (ref 12.5–14.5)
RA PRESSURE ESTIMATED: 3 MMHG
RBC # BLD AUTO: 4 X10(6)/MCL (ref 4.2–5.4)
RBC #/AREA URNS AUTO: 10 /HPF
RBC UR QL AUTO: ABNORMAL
RV TB RVSP: 6 MMHG
SODIUM SERPL-SCNC: 145 MMOL/L (ref 136–145)
SP GR UR STRIP.AUTO: 1.02 (ref 1–1.03)
SQUAMOUS #/AREA URNS AUTO: <5 /HPF
TDI LATERAL: 0.05 M/S
TDI SEPTAL: 0.04 M/S
TDI: 0.05 M/S
TR MAX PG: 25 MMHG
TRICUSPID ANNULAR PLANE SYSTOLIC EXCURSION: 1.83 CM
TV REST PULMONARY ARTERY PRESSURE: 28 MMHG
UROBILINOGEN UR STRIP-ACNC: 1
WBC # SPEC AUTO: 3.86 X10(3)/MCL (ref 4.5–11.5)
WBC #/AREA URNS AUTO: 9 /HPF
Z-SCORE OF LEFT VENTRICULAR DIMENSION IN END DIASTOLE: -1.04
Z-SCORE OF LEFT VENTRICULAR DIMENSION IN END SYSTOLE: 1.15

## 2023-09-26 PROCEDURE — 85025 COMPLETE CBC W/AUTO DIFF WBC: CPT

## 2023-09-26 PROCEDURE — 99204 OFFICE O/P NEW MOD 45 MIN: CPT | Mod: ,,, | Performed by: PSYCHIATRY & NEUROLOGY

## 2023-09-26 PROCEDURE — 97165 OT EVAL LOW COMPLEX 30 MIN: CPT

## 2023-09-26 PROCEDURE — 82553 CREATINE MB FRACTION: CPT

## 2023-09-26 PROCEDURE — 83735 ASSAY OF MAGNESIUM: CPT

## 2023-09-26 PROCEDURE — 25000003 PHARM REV CODE 250: Performed by: INTERNAL MEDICINE

## 2023-09-26 PROCEDURE — 80053 COMPREHEN METABOLIC PANEL: CPT

## 2023-09-26 PROCEDURE — 92523 SPEECH SOUND LANG COMPREHEN: CPT

## 2023-09-26 PROCEDURE — 84100 ASSAY OF PHOSPHORUS: CPT

## 2023-09-26 PROCEDURE — 83036 HEMOGLOBIN GLYCOSYLATED A1C: CPT

## 2023-09-26 PROCEDURE — 85610 PROTHROMBIN TIME: CPT

## 2023-09-26 PROCEDURE — G0378 HOSPITAL OBSERVATION PER HR: HCPCS

## 2023-09-26 PROCEDURE — 85730 THROMBOPLASTIN TIME PARTIAL: CPT

## 2023-09-26 PROCEDURE — 81001 URINALYSIS AUTO W/SCOPE: CPT

## 2023-09-26 PROCEDURE — 99204 PR OFFICE/OUTPT VISIT, NEW, LEVL IV, 45-59 MIN: ICD-10-PCS | Mod: ,,, | Performed by: PSYCHIATRY & NEUROLOGY

## 2023-09-26 PROCEDURE — 25000003 PHARM REV CODE 250

## 2023-09-26 PROCEDURE — 97161 PT EVAL LOW COMPLEX 20 MIN: CPT

## 2023-09-26 RX ADMIN — ATORVASTATIN CALCIUM 40 MG: 40 TABLET, FILM COATED ORAL at 09:09

## 2023-09-26 RX ADMIN — METOPROLOL SUCCINATE 50 MG: 50 TABLET, EXTENDED RELEASE ORAL at 09:09

## 2023-09-26 RX ADMIN — POTASSIUM BICARBONATE 50 MEQ: 977.5 TABLET, EFFERVESCENT ORAL at 09:09

## 2023-09-26 RX ADMIN — EZETIMIBE 10 MG: 10 TABLET ORAL at 09:09

## 2023-09-26 RX ADMIN — AMLODIPINE BESYLATE 10 MG: 5 TABLET ORAL at 09:09

## 2023-09-26 RX ADMIN — ASPIRIN 81 MG: 81 TABLET, COATED ORAL at 09:09

## 2023-09-26 NOTE — NURSING
Nurses Note -- 4 Eyes      9/26/2023   7:55 AM      Skin assessed during: Admit      [] No Altered Skin Integrity Present    []Prevention Measures Documented      [] Yes- Altered Skin Integrity Present or Discovered   [] LDA Added if Not in Epic (Describe Wound)   [] New Altered Skin Integrity was Present on Admit and Documented in LDA   [] Wound Image Taken    Wound Care Consulted? No    Attending Nurse:  Candace MEHTA RN     Second RN/Staff Member:  Zoe Carmichael RN

## 2023-09-26 NOTE — CONSULTS
[facility] - 4th Floor Medical Telemetry  Neurology  Consult Note    Patient Name: Minda Bright  MRN: [id number]  Admission Date: 9/25/2023  Hospital Length of Stay: 0 days  Code Status: Full Code   Attending Provider: Hannah Jameson DO   Consulting Provider: Linda Schrader NP  Primary Care Physician: Td Bolden MD  Principal Problem:TIA (transient ischemic attack)    Inpatient consult to Neurology  Consult performed by: Linda Schrader NP  Consult ordered by: Tana Bran MD         Subjective:     Chief Complaint:  Visual disturbance      HPI:   78 year old female with a past medical history of HTN and HLD presented to ED on 9/25 for right eye vision disturbance. She reported she had labs drawn earlier in the morning. She and her  were driving to breakfast when she suddenly had a grayish cloud come over her right eye. They turned around and went home. Symptoms lasted 5 minutes, she came to ED for further evaluation. Upon arrival to ED, /91. CT head was negative. Neurology was consulted for TIA.       She reports she has chronic back pain, she had back surgery about 20 years ago. She occasionally has left arm numbness but believes this to be positional. She is followed by [facility], was seen last month, had a CUS done which showed 40% stenosis in her neck per her report.           Past Medical History:   Diagnosis Date    Anxiety disorder, unspecified     Asthma     COPD (chronic obstructive pulmonary disease)     Hyperlipidemia     Hypertension     Obesity, unspecified        Past Surgical History:   Procedure Laterality Date    BACK SURGERY      BREAST LUMPECTOMY Right     LEFT HEART CATHETERIZATION Left 5/3/2022    Procedure: CATHETERIZATION, HEART, LEFT;  Surgeon: Jaskaran Kevin MD;  Location: [facility] CATH LAB;  Service: Cardiology;  Laterality: Left;  [facility] +/- PCI VIA RRA    ROTATOR CUFF REPAIR Right     TONSILLECTOMY      TOTAL KNEE  ARTHROPLASTY Right        Review of patient's allergies indicates:   Allergen Reactions    Arb-angiotensin receptor antagonist Swelling       Current Neurological Medications:     Current Facility-Administered Medications on File Prior to Encounter   Medication    diphenhydrAMINE capsule 50 mg    sodium chloride 0.9% flush 10 mL     Current Outpatient Medications on File Prior to Encounter   Medication Sig    amLODIPine (NORVASC) 10 MG tablet Take 10 mg by mouth once daily.    ascorbic acid, vitamin C, (VITAMIN C) 500 MG tablet Take 500 mg by mouth every evening.    aspirin (ECOTRIN) 81 MG EC tablet Take 81 mg by mouth once daily.    ezetimibe (ZETIA) 10 mg tablet Take 10 mg by mouth once daily.    metoprolol succinate (TOPROL-XL) 50 MG 24 hr tablet Take 50 mg by mouth once daily.    multivitamin (ONE DAILY MULTIVITAMIN) per tablet Take 1 tablet by mouth once daily.    omeprazole (PRILOSEC) 40 MG capsule Take 40 mg by mouth once daily.    rosuvastatin (CRESTOR) 40 MG Tab Take 10 mg by mouth every evening.    vitamin D (VITAMIN D3) 1000 units Tab Take 1,000 Units by mouth every evening.    vitamin E 400 UNIT capsule Take 400 Units by mouth once daily.    [DISCONTINUED] clopidogreL (PLAVIX) 300 mg Tab Take 300 mg by mouth once.     Family History    None       Tobacco Use    Smoking status: Never    Smokeless tobacco: Never   Substance and Sexual Activity    Alcohol use: Never    Drug use: Never    Sexual activity: Not on file     Review of Systems   Eyes:  Positive for visual disturbance (resolved).   Neurological:  Negative for dizziness, tremors, seizures, syncope, facial asymmetry, speech difficulty, weakness, light-headedness, numbness and headaches.   All other systems reviewed and are negative.    Objective:     Vital Signs (Most Recent):  Temp: 98.4 °F (36.9 °C) (09/26/23 0718)  Pulse: 73 (09/26/23 0718)  Resp: 18 (09/26/23 0718)  BP: (!) 159/75 (09/26/23 0718)  SpO2: 96 % (09/26/23 0718)  Vital Signs (24h Range):  Temp:  [97.4 °F (36.3 °C)-98.4 °F (36.9 °C)] 98.4 °F (36.9 °C)  Pulse:  [73-93] 73  Resp:  [16-18] 18  SpO2:  [96 %-98 %] 96 %  BP: (156-183)/(69-96) 159/75     Weight: 89.1 kg (196 lb 8 oz)  Body mass index is 31.72 kg/m².     Physical Exam  Vitals and nursing note reviewed.   Constitutional:       General: She is not in acute distress.     Appearance: Normal appearance.   HENT:      Head: Normocephalic.   Eyes:      General: No visual field deficit.     Extraocular Movements:      Right eye: Normal extraocular motion and no nystagmus.      Left eye: Normal extraocular motion and no nystagmus.      Pupils: Pupils are equal, round, and reactive to light.   Cardiovascular:      Rate and Rhythm: Normal rate.   Pulmonary:      Effort: Pulmonary effort is normal.      Breath sounds: Normal breath sounds.   Musculoskeletal:         General: No swelling. Normal range of motion.      Cervical back: Normal range of motion.      Right lower leg: No edema.      Left lower leg: No edema.   Skin:     General: Skin is warm and dry.      Capillary Refill: Capillary refill takes less than 2 seconds.   Neurological:      General: No focal deficit present.      Mental Status: She is alert and oriented to person, place, and time.      Cranial Nerves: No cranial nerve deficit, dysarthria or facial asymmetry.      Sensory: Sensation is intact. No sensory deficit.      Motor: Motor function is intact. No weakness, tremor, atrophy, abnormal muscle tone, seizure activity or pronator drift.      Coordination: Coordination normal. Finger-Nose-Finger Test normal.   Psychiatric:         Attention and Perception: Attention normal.         Mood and Affect: Affect normal.         Speech: Speech normal.         Behavior: Behavior normal.          NEUROLOGICAL EXAMINATION:     MENTAL STATUS   Oriented to person, place, and time.   Speech: speech is normal     CRANIAL NERVES     CN III, IV, VI   Pupils are equal, round,  and reactive to light.    GAIT AND COORDINATION      Coordination   Finger to nose coordination: normal      Significant Labs:   Recent Lab Results         09/26/23  0825   09/26/23  0504   09/26/23  0503   09/25/23  1153        Albumin/Globulin Ratio   1.5     1.2       Albumin   3.6     3.8       Alkaline Phosphatase   62     65       ALT   10     9       Appearance, UA Clear             aPTT     40.7  Comment: For Minimal Heparin Infusion, the goal aPTT 64-85 seconds corresponds to an anti-Xa of 0.3-0.5.    For Low Intensity and High Intensity Heparin, the goal aPTT  seconds corresponds to an anti-Xa of 0.3-0.7         AST   21     21       Bacteria, UA None Seen             Baso #   0.02     0.03       Basophil %   0.5     0.8       BILIRUBIN TOTAL   0.6     0.5       Bilirubin, UA Negative             BUN   10.1     10.9       Calcium   9.2     9.7       Chloride   108     105       Cholesterol       139       CO2   28     29       Color, UA Yellow             CPK MB   2.5           Creatinine   0.73     0.82       eGFR   >60     >60       Eos #   0.22     0.27       Eosinophil %   5.7     7.2       Estimated Avg Glucose   102.5           Globulin, Total   2.4     3.1       Glucose   97     121       Glucose, UA Negative             HDL       51       Hematocrit   35.1     38.4       Hemoglobin   11.5     12.5       Hemoglobin A1C External   5.2           Immature Grans (Abs)   0.02     0.01       Immature Granulocytes   0.5     0.3       INR     1.1   1.1       Ketones, UA Negative             LDL Cholesterol External       71.00       Leukocytes, UA 1+             Lymph #   1.29     1.06       LYMPH %   33.4     28.3       Magnesium    2.20           MCH   28.8     28.9       MCHC   32.8     32.6       MCV   87.8     88.9       Mono #   0.40     0.30       Mono %   10.4     8.0       MPV   11.8     11.9       Neut #   1.91     2.07       Neut %   49.5     55.4       NITRITE UA Negative             nRBC    0.0     0.0       Occult Blood UA 1+             pH, UA 7.0             Phosphorus   3.1           Platelets   148     183       Potassium   3.4     3.3       PROTEIN TOTAL   6.0     6.9       Protein, UA Negative             Protime     14.0   13.8       RBC   4.00     4.32       RBC, UA 10             RDW   13.1     13.1       Sodium   145     143       Specific Gravity,UA 1.021             Squam Epithel, UA <5             Thyroid Stimulating Hormone       3.887       Total Cholesterol/HDL Ratio       3       Triglycerides       86       Urobilinogen, UA 1.0             Very Low Density Lipoprotein       17       WBC, UA 9             WBC   3.86     3.74               Significant Imaging: I have reviewed all pertinent imaging results/findings within the past 24 hours.    Assessment and Plan:     * TIA (transient ischemic attack)  Visual disturbance-TIA?    Was on aspirin 81 mg at home, reports compliance   Continue stroke workup  Will discuss CTA recommendations and further workup  Awaiting ECHO and CUS        Stroke workup   -CT head: negative  -CTA head and neck:   1. No large vessel occlusion.  2. Multifocal narrowing of the left posterior cerebral artery.  -MRI brain:  No acute intracranial process is identified. Details and findings as above.  -LDL: 71  -A1c: 5.2   -TSH: 3.887            VTE Risk Mitigation (From admission, onward)         Ordered     enoxaparin injection 40 mg  Daily         09/25/23 1702     IP VTE HIGH RISK PATIENT  Once         09/25/23 1702     Place sequential compression device  Until discontinued         09/25/23 1702                Thank you for your consult. Further recommendations to follow from MD Linda Schrader, NP  Neurology  [facility] - 4th Floor Medical Telemetry

## 2023-09-26 NOTE — PT/OT/SLP EVAL
"Occupational Therapy   Evaluation and Discharge Note    Name: Minda Bright  MRN: [id number]  Admitting Diagnosis: r/o CVA  Recent Surgery: * No surgery found *      Recommendations:     Discharge Recommendations: home  Discharge Equipment Recommendations: none  Barriers to discharge:  none evident    Assessment:     Minda Bright is a 78 y.o. female with a medical diagnosis of TIA, hypokalemia, ht, gerd. On eval, patient presents pleasant and agreeable. Recommend home with family. Skilled OT services are not warranted at this time.    Plan:     OT to sign off as acute OT services are not warranted at this time.  Please re-consult if situation changes during this hospitalization.    Plan of Care Reviewed with: patient, spouse    Subjective     Chief Complaint: no complaints  Patient/Family Comments/goals: "I think I'm going home this afternoon"    Occupational Profile:  Living Environment: lives with  who is able to assist as needed, no steps, walk in shower and tub  Previous level of function: independent  Roles and Routines: wife  Equipment Used at Home:  (pt has access to shower chair, BSC, RW)  Assistance upon Discharge:     Pain/Comfort:  Pain Rating 1: 0/10    Patients cultural, spiritual, Yazdanism conflicts given the current situation:      Objective:     OT communicated with RN prior to session.      Patient was found HOB elevated with  (telemetry) upon OT entry to room.    General Precautions: Standard,    Orthopedic Precautions: N/A  Braces: N/A    Vital Signs: 183/76, meds provided prior to session by RN    Bed Mobility:    Patient completed Supine to Sit with independence  Patient completed Sit to Supine with independence    Functional Mobility/Transfers:  Patient completed Sit <> Stand Transfer with independence  with  no assistive device   Patient completed Toilet Transfer Step Transfer technique with independence with  no AD  Functional Mobility: Independent with functional " mobility to restroom    Activities of Daily Living:  Lower Body Dressing: independence socks  Toileting: independence socks      Functional Cognition:  Intact    Visual Perceptual Skills:  Intact, able to track    Upper Extremity Function:  Right Upper Extremity:   WFL    Left Upper Extremity:  WFL    Balance:   Intact    Therapeutic Positioning  Risk for acquired pressure injuries is decreased due to ability to mobilize independently .    OT interventions performed during the course of today's session in an effort to prevent and/or reduce acquired pressure injuries:   Education was provided on benefits of and recommendations for therapeutic positioning    Skin assessment: all bony prominences were assessed    Findings: no redness or breakdown noted    OT recommendations for therapeutic positioning throughout hospitalization:   Follow [facility] Pressure Injury Prevention Protocol        Patient Education:  Patient provided with verbal education education regarding OT role/goals/POC and Discharge/DME recommendations.  Understanding was verbalized.     Patient left up in chair with all lines intact and call button in reach,  present  Please re-consult if needs arise, pt feels she is at her baseline at this time.  Numbness in LUE has resolved and she reports eyes are slightly blurry but she feels it is due to lighting in room.  History:     Past Medical History:   Diagnosis Date    Anxiety disorder, unspecified     Asthma     COPD (chronic obstructive pulmonary disease)     Hyperlipidemia     Hypertension     Obesity, unspecified          Past Surgical History:   Procedure Laterality Date    BACK SURGERY      BREAST LUMPECTOMY Right     LEFT HEART CATHETERIZATION Left 5/3/2022    Procedure: CATHETERIZATION, HEART, LEFT;  Surgeon: Jaskaran Kevin MD;  Location: [facility] CATH LAB;  Service: Cardiology;  Laterality: Left;  [facility] +/- PCI VIA RRA    ROTATOR CUFF REPAIR Right     TONSILLECTOMY      TOTAL KNEE ARTHROPLASTY  Right        Time Tracking:     OT Date of Treatment:    OT Start Time: 0947  OT Stop Time: 0959  OT Total Time (min): 12 min    Billable Minutes:Evaluation LOW    9/26/2023

## 2023-09-26 NOTE — PT/OT/SLP EVAL
Physical Therapy Evaluation and Discharge Note    Patient Name:  Minda Bright   MRN:  [id number]    Recommendations:     Discharge Recommendations: home, outpatient PT  Discharge Equipment Recommendations: none   Barriers to discharge: None    Assessment:     Minda Bright is a 78 y.o. female admitted with a medical diagnosis of TIA with vision changes. Patient tolerated PT evaluation well, reports she is at baseline level of mobility. Patient demonstrated independence with bed mobility, transfers, and gait x150ft. At this time, patient is functioning at their prior level of function and does not require further acute PT services. PT to sign off, patient may benefit from outpatient PT follow up for exercise program targeting fall risk reduction.     Recent Surgery: * No surgery found *      Plan:     During this hospitalization, patient does not require further acute PT services.  Please re-consult if situation changes.      Subjective     Chief Complaint: none stated  Patient/Family Comments/goals: to go home  Pain/Comfort:   No pain    Patients cultural, spiritual, Islam conflicts given the current situation: no    Living Environment:  Pt lives with  in [facility] with no steps to enter.  Prior to admission, patients level of function was independent. Equipment used at home: none.  DME owned (not currently used): none.  Upon discharge, patient will have assistance from .    Objective:     Communicated with RN prior to session.  Patient found HOB elevated with peripheral IV upon PT entry to room.    General Precautions: Standard,      Orthopedic Precautions:N/A   Braces: N/A  Respiratory Status: Room air  Blood Pressure:     Exams:  Cognitive Exam:  Patient is oriented to Person, Place, Time, and Situation  RLE ROM: WNL  RLE Strength: WNL  LLE ROM: WNL  LLE Strength: WNL    Functional Mobility:  Bed Mobility:  Supine to Sit: independence  Transfers:  Sit to Stand:  independence with no  AD  Gait: patient ambulated approx 150ft independently with no AD, noted decreased gait speed, no LOB observed.     AM-PAC 6 CLICK MOBILITY  Total Score:24       Treatment and Education:    Patient provided with verbal education education regarding PT recommendations.  Understanding was verbalized.     Patient left sitting edge of bed with all lines intact, call button in reach, and  present.    GOALS:   Multidisciplinary Problems       Physical Therapy Goals          Problem: Physical Therapy    Goal Priority Disciplines Outcome Goal Variances Interventions   Physical Therapy Goal     PT, PT/OT                          History:     Past Medical History:   Diagnosis Date    Anxiety disorder, unspecified     Asthma     COPD (chronic obstructive pulmonary disease)     Hyperlipidemia     Hypertension     Obesity, unspecified        Past Surgical History:   Procedure Laterality Date    BACK SURGERY      BREAST LUMPECTOMY Right     LEFT HEART CATHETERIZATION Left 5/3/2022    Procedure: CATHETERIZATION, HEART, LEFT;  Surgeon: Jaskaran Kevin MD;  Location: [facility] CATH LAB;  Service: Cardiology;  Laterality: Left;  [facility] +/- PCI VIA RRA    ROTATOR CUFF REPAIR Right     TONSILLECTOMY      TOTAL KNEE ARTHROPLASTY Right        Time Tracking:     PT Received On: 09/26/23  PT Start Time: 1102     PT Stop Time: 1112  PT Total Time (min): 10 min     Billable Minutes: Evaluation Low complexity, discharge from acute PT services      09/26/2023

## 2023-09-26 NOTE — PT/OT/SLP EVAL
[facility]  Speech Language Pathology Department  Cognitive-Communication Evaluation    Patient Name:  Minda Bright   MRN:  [id number]    Recommendations:     General recommendations:  SLP intervention not indicated  Communication strategies:  go to room if call light pushed    Discharge recommendations:  Discharge Facility/Level of Care Needs: home   Barriers to safe discharge: acuity of illness    History:     Minda Bright is a/n 78 y.o. female admitted for CVA workup, passed Garcia swallow screen.    Past Medical History:   Diagnosis Date    Anxiety disorder, unspecified     Asthma     COPD (chronic obstructive pulmonary disease)     Hyperlipidemia     Hypertension     Obesity, unspecified      Past Surgical History:   Procedure Laterality Date    BACK SURGERY      BREAST LUMPECTOMY Right     LEFT HEART CATHETERIZATION Left 5/3/2022    Procedure: CATHETERIZATION, HEART, LEFT;  Surgeon: Jaskaran Kevin MD;  Location: [facility] CATH LAB;  Service: Cardiology;  Laterality: Left;  [facility] +/- PCI VIA RRA    ROTATOR CUFF REPAIR Right     TONSILLECTOMY      TOTAL KNEE ARTHROPLASTY Right        Previous level of Function  Education:high school  Occupation: unemployed  Lives: with spouse  Handed: Right  Glasses:  readers  Hearing Aids: no  Home Responsibilities:  shared responsibility with ,  manages finances    Imaging   Results for orders placed during the hospital encounter of 09/25/23    MRI Brain Without Contrast    Narrative  Technique:Multiplanar, multisequence magnetic resonance imaging of the brain was performed without intravenous contrast.    Comparison:Comparison is with study dated 2023-09-25 11:25:52.    Clinical history:Ams.    Findings:    Hemorrhage:No acute intracranial hemorrhage is identified.    Stroke:No abnormal signal is identified on the diffusion images to suggest acute infarct.    Extra axial spaces:The ventricles and sulci and basal cisterns  all appear mildly prominent suggesting an element of global cerebral atrophy.    Cerebral, cerebellar, and brainstem parenchyma:Subtle periventricular white matter signal abnormalities are seen. The main consideration is small vessel ischemic changes in a patient of this age.    Calvarium:Within normal limits.    Visualized paranasal sinuses:There is mucoperiosteal thickening in the left frontal and bilateral maxillary, ethmoid and sphenoid sinuses. Correlate clinically for sinusitis.    Impression  Impression:    No acute intracranial process is identified. Details and findings as above.    No significant discrepancy with overnight report.      Electronically signed by: Curtis Elkins  Date:    09/26/2023  Time:    07:36    Subjective     Patient awake, alert, calm, and cooperative.    Patient goals: to get better     Spiritual/Cultural/Yazidism Beliefs/Practices that affect care: no  Pain/Comfort: Pain Rating 1: 0/10  Respiratory Status: room air    Objective:     ORAL MUSCULATURE  Facial Movement: WFL  Buccal Strength & Mobility: WFL  Mandibular Strength & Mobility: WFL  Oral Labial Strength & Mobility: WFL  Lingual Strength & Mobility: WFL    SPEECH PRODUCTION  Phoneme Production: adequate  Voice Quality: adequate  Voice Production: adequate  Speech Rate: appropriate  Loudness: acceptable  Respiration: WFL for speech  Resonance: adequate  Prosody: adequate  Speech Intelligibility  Known Context: Greater that 90%  Unknown Context: Greater that 90%    AUDITORY COMPREHENSION  Following Directions:  1-Step: 100  2-Step: 100  Yes/No Questions:  Biographical: 100  Environmental: 100  Simple: 100  Complex: 100    VERBAL EXPRESSION  Automatic Speech:  Days of the week: WFL  Counting: WFL  Repetition:  Multi-syllabic words: WFL    Confrontation Naming  Objects: 100  Wh- Questions:  Object name: 100  Object function: 100    COGNITION  Orientation:  Person: yes  Place: yes  Time: yes  Situation: yes   Attention:  Focused:  WFL  Sustained: WFL  Pragmatics:  Eye contact: WFL  Personal space: WFL  Facial expression: WFL  Communicative Intent: WFL  Memory:  Immediate: WFL  Short Term: 7/12 on Four Word Memory Task, pt and spouse report baseline  Long Term: WFL  Problem Solving  Functional simple: WFL  Functional complex: WFL  Organization:  Convergent thinking: WFL  Divergent thinking: WFL  Executive Function:  Attention to detail: WFL  Awareness: WFL  Cognitive endurance: WFL  Information processing: WFL    Assessment:     Pt presents with speech, language, and cognitive abilities WFL.  Skilled SLP services not warranted, please reconsult as needed.    Goals:     Multidisciplinary Problems       SLP Goals       Not on file                  Patient Education:     Patient and spouse were provided with verbal education regarding POC.  Understanding was verbalized.    Plan:     SLP Follow-Up:  No   Patient to be seen:      Plan of Care expires:     Plan of Care reviewed with:  patient, spouse      Time Tracking:     SLP Treatment Date:   09/26/23  Speech Start Time:  1040  Speech Stop Time:  1100     Speech Total Time (min):  20 min    Billable minutes:  Evaluation of Speech Sound Production with Comprehension and Expression, 20 minutes     09/26/2023

## 2023-09-26 NOTE — PROGRESS NOTES
[facility] Hospital Medicine Progress Note        Chief Complaint: Inpatient Follow-up for     HPI: 78 y.o. female who past medical history of HTN and HLD known to [facility] presented to [facility] on 9/25/2023 with a primary complaint of right eye blurry vision and left upper extremity numbness lasting 5 minutes that started this morning at 8:30 a.m. while riding down the road with her .  The patient explains she was scheduled for routine lab work this morning with her PCP and while driving down the road home, she developed the mentioned symptoms.  She states they resolved in about 5 minutes.  She states she called her PCP who recommended ER evaluation.  The patient explains that she does not have a history of TIA, CVA, or MI/cardiac stents.  The patient denies any unilateral weakness, dysarthria, facial asymmetry, dysphagia, or headaches.  Patient also denies any chest pain, shortness a breath, cough, abdominal pain, nausea, vomiting, diarrhea, dysuria, bleeding symptoms, syncope, falls, any injuries.     ED course: Vital signs revealed the patient is afebrile, elevated /91 improved to 150 9/78, other vital signs stable on room air.  Labs with unremarkable CBC, unremarkable PT/INR, chemistry with mildly decreased potassium at 3.3.  Normal TSH.  CT head without contrast showed no acute findings.  EKG with sinus rhythm   MRI of the brain negative neurology consulted    Interval Hx:   Patient seen and examined this morning almost back to normal significant other bedside  Case was discussed with patient's nurse and  on the floor.    Objective/physical exam:  General: In no acute distress, afebrile  Chest: Clear to auscultation bilaterally  Heart: RRR, +S1, S2, no appreciable murmur  Abdomen: Soft, nontender, BS +  MSK: Warm, no lower extremity edema, no clubbing or cyanosis  Neurologic: Alert and oriented x4,   VITAL SIGNS: 24 HRS MIN & MAX LAST   Temp  Min: 97.4 °F (36.3  °C)  Max: 98.8 °F (37.1 °C) 98.8 °F (37.1 °C)   BP  Min: 125/80  Max: 183/77 125/80   Pulse  Min: 73  Max: 93  88   Resp  Min: 16  Max: 18 18   SpO2  Min: 94 %  Max: 98 % (!) 94 %     I have reviewed the following labs:  Recent Labs   Lab 09/25/23  1153 09/26/23  0504   WBC 3.74* 3.86*   RBC 4.32 4.00*   HGB 12.5 11.5*   HCT 38.4 35.1*   MCV 88.9 87.8   MCH 28.9 28.8   MCHC 32.6* 32.8*   RDW 13.1 13.1    148   MPV 11.9* 11.8*     Recent Labs   Lab 09/25/23  1153 09/26/23  0504    145   K 3.3* 3.4*   CO2 29 28   BUN 10.9 10.1   CREATININE 0.82 0.73   CALCIUM 9.7 9.2   MG  --  2.20   ALBUMIN 3.8 3.6   ALKPHOS 65 62   ALT 9 10   AST 21 21   BILITOT 0.5 0.6     Microbiology Results (last 7 days)       ** No results found for the last 168 hours. **             See below for Radiology    Scheduled Med:   amLODIPine  10 mg Oral Daily    aspirin  81 mg Oral Daily    atorvastatin  40 mg Oral Daily    enoxparin  40 mg Subcutaneous Daily    ezetimibe  10 mg Oral Daily    metoprolol succinate  50 mg Oral Daily      Continuous Infusions:     PRN Meds:  labetalol, ondansetron, sodium chloride 0.9%     Assessment/Plan:  GI   MRI negative for acute infarct   Hypokalemia resolved   Essential hypertension  Hyperlipidemia  GERD     On baby aspirin and statin  CTA of the head and neck no large vessel occlusion multifocal narrowing of the left posterior cerebral artery   2D echo shows normal EF, moderate MS trivial effusion no indication of cardiac tamponade   Worked with PT and OT   Awaiting neurology's anticoagulation recommendations and then we will discharge most likely later today    VTE prophylaxis:     Patient condition:  Stable/Fair/Guarded/ Serious/ Critical    Anticipated discharge and Disposition:         All diagnosis and differential diagnosis have been reviewed; assessment and plan has been documented; I have personally reviewed the labs and test results that are presently available; I have reviewed the  patients medication list; I have reviewed the consulting providers response and recommendations. I have reviewed or attempted to review medical records based upon their availability    All of the patient's questions have been  addressed and answered. Patient's is agreeable to the above stated plan. I will continue to monitor closely and make adjustments to medical management as needed.  _____________________________________________________________________    Nutrition Status:    Radiology:  I have personally reviewed the following imaging and agree with the radiologist.     MRI Brain Without Contrast  Narrative: Technique:Multiplanar, multisequence magnetic resonance imaging of the brain was performed without intravenous contrast.    Comparison:Comparison is with study dated 2023-09-25 11:25:52.    Clinical history:Ams.    Findings:    Hemorrhage:No acute intracranial hemorrhage is identified.    Stroke:No abnormal signal is identified on the diffusion images to suggest acute infarct.    Extra axial spaces:The ventricles and sulci and basal cisterns all appear mildly prominent suggesting an element of global cerebral atrophy.    Cerebral, cerebellar, and brainstem parenchyma:Subtle periventricular white matter signal abnormalities are seen. The main consideration is small vessel ischemic changes in a patient of this age.    Calvarium:Within normal limits.    Visualized paranasal sinuses:There is mucoperiosteal thickening in the left frontal and bilateral maxillary, ethmoid and sphenoid sinuses. Correlate clinically for sinusitis.  Impression: Impression:    No acute intracranial process is identified. Details and findings as above.    No significant discrepancy with overnight report.    Electronically signed by: Curtis Elkins  Date:    09/26/2023  Time:    07:36      Tana Bran MD   09/26/2023

## 2023-09-26 NOTE — ASSESSMENT & PLAN NOTE
Visual disturbance-TIA?    Was on aspirin 81 mg at home, reports compliance   Continue stroke workup  Will discuss CTA recommendations and further workup  Awaiting ECHO and CUS        Stroke workup   -CT head: negative  -CTA head and neck:   1. No large vessel occlusion.  2. Multifocal narrowing of the left posterior cerebral artery.  -MRI brain:  No acute intracranial process is identified. Details and findings as above.  -LDL: 71  -A1c: 5.2   -TSH: 3.887

## 2023-09-26 NOTE — HPI
78 year old female with a past medical history of HTN and HLD presented to ED on 9/25 for right eye vision disturbance. She reported she had labs drawn earlier in the morning. She and her  were driving to breakfast when she suddenly had a grayish cloud come over her right eye. They turned around and went home. Symptoms lasted 5 minutes, she came to ED for further evaluation. Upon arrival to ED, /91. CT head was negative. Neurology was consulted for TIA.       She reports she has chronic back pain, she had back surgery about 20 years ago. She occasionally has left arm numbness but believes this to be positional. She is followed by [facility], was seen last month, had a CUS done which showed 40% stenosis in her neck per her report.

## 2023-09-26 NOTE — SUBJECTIVE & OBJECTIVE
Past Medical History:   Diagnosis Date    Anxiety disorder, unspecified     Asthma     COPD (chronic obstructive pulmonary disease)     Hyperlipidemia     Hypertension     Obesity, unspecified        Past Surgical History:   Procedure Laterality Date    BACK SURGERY      BREAST LUMPECTOMY Right     LEFT HEART CATHETERIZATION Left 5/3/2022    Procedure: CATHETERIZATION, HEART, LEFT;  Surgeon: Jaskaran Kevin MD;  Location: [facility] CATH LAB;  Service: Cardiology;  Laterality: Left;  LHC +/- PCI VIA RRA    ROTATOR CUFF REPAIR Right     TONSILLECTOMY      TOTAL KNEE ARTHROPLASTY Right        Review of patient's allergies indicates:   Allergen Reactions    Arb-angiotensin receptor antagonist Swelling       Current Neurological Medications:     Current Facility-Administered Medications on File Prior to Encounter   Medication    diphenhydrAMINE capsule 50 mg    sodium chloride 0.9% flush 10 mL     Current Outpatient Medications on File Prior to Encounter   Medication Sig    amLODIPine (NORVASC) 10 MG tablet Take 10 mg by mouth once daily.    ascorbic acid, vitamin C, (VITAMIN C) 500 MG tablet Take 500 mg by mouth every evening.    aspirin (ECOTRIN) 81 MG EC tablet Take 81 mg by mouth once daily.    ezetimibe (ZETIA) 10 mg tablet Take 10 mg by mouth once daily.    metoprolol succinate (TOPROL-XL) 50 MG 24 hr tablet Take 50 mg by mouth once daily.    multivitamin (ONE DAILY MULTIVITAMIN) per tablet Take 1 tablet by mouth once daily.    omeprazole (PRILOSEC) 40 MG capsule Take 40 mg by mouth once daily.    rosuvastatin (CRESTOR) 40 MG Tab Take 10 mg by mouth every evening.    vitamin D (VITAMIN D3) 1000 units Tab Take 1,000 Units by mouth every evening.    vitamin E 400 UNIT capsule Take 400 Units by mouth once daily.    [DISCONTINUED] clopidogreL (PLAVIX) 300 mg Tab Take 300 mg by mouth once.     Family History    None       Tobacco Use    Smoking status: Never    Smokeless tobacco: Never   Substance and Sexual Activity     Alcohol use: Never    Drug use: Never    Sexual activity: Not on file     Review of Systems   Eyes:  Positive for visual disturbance (resolved).   Neurological:  Negative for dizziness, tremors, seizures, syncope, facial asymmetry, speech difficulty, weakness, light-headedness, numbness and headaches.   All other systems reviewed and are negative.    Objective:     Vital Signs (Most Recent):  Temp: 98.4 °F (36.9 °C) (09/26/23 0718)  Pulse: 73 (09/26/23 0718)  Resp: 18 (09/26/23 0718)  BP: (!) 159/75 (09/26/23 0718)  SpO2: 96 % (09/26/23 0718) Vital Signs (24h Range):  Temp:  [97.4 °F (36.3 °C)-98.4 °F (36.9 °C)] 98.4 °F (36.9 °C)  Pulse:  [73-93] 73  Resp:  [16-18] 18  SpO2:  [96 %-98 %] 96 %  BP: (156-183)/(69-96) 159/75     Weight: 89.1 kg (196 lb 8 oz)  Body mass index is 31.72 kg/m².     Physical Exam  Vitals and nursing note reviewed.   Constitutional:       General: She is not in acute distress.     Appearance: Normal appearance.   HENT:      Head: Normocephalic.   Eyes:      General: No visual field deficit.     Extraocular Movements:      Right eye: Normal extraocular motion and no nystagmus.      Left eye: Normal extraocular motion and no nystagmus.      Pupils: Pupils are equal, round, and reactive to light.   Cardiovascular:      Rate and Rhythm: Normal rate.   Pulmonary:      Effort: Pulmonary effort is normal.      Breath sounds: Normal breath sounds.   Musculoskeletal:         General: No swelling. Normal range of motion.      Cervical back: Normal range of motion.      Right lower leg: No edema.      Left lower leg: No edema.   Skin:     General: Skin is warm and dry.      Capillary Refill: Capillary refill takes less than 2 seconds.   Neurological:      General: No focal deficit present.      Mental Status: She is alert and oriented to person, place, and time.      Cranial Nerves: No cranial nerve deficit, dysarthria or facial asymmetry.      Sensory: Sensation is intact. No sensory deficit.       Motor: Motor function is intact. No weakness, tremor, atrophy, abnormal muscle tone, seizure activity or pronator drift.      Coordination: Coordination normal. Finger-Nose-Finger Test normal.   Psychiatric:         Attention and Perception: Attention normal.         Mood and Affect: Affect normal.         Speech: Speech normal.         Behavior: Behavior normal.          NEUROLOGICAL EXAMINATION:     MENTAL STATUS   Oriented to person, place, and time.   Speech: speech is normal     CRANIAL NERVES     CN III, IV, VI   Pupils are equal, round, and reactive to light.    GAIT AND COORDINATION      Coordination   Finger to nose coordination: normal      Significant Labs:   Recent Lab Results         09/26/23  0825   09/26/23  0504   09/26/23  0503   09/25/23  1153        Albumin/Globulin Ratio   1.5     1.2       Albumin   3.6     3.8       Alkaline Phosphatase   62     65       ALT   10     9       Appearance, UA Clear             aPTT     40.7  Comment: For Minimal Heparin Infusion, the goal aPTT 64-85 seconds corresponds to an anti-Xa of 0.3-0.5.    For Low Intensity and High Intensity Heparin, the goal aPTT  seconds corresponds to an anti-Xa of 0.3-0.7         AST   21     21       Bacteria, UA None Seen             Baso #   0.02     0.03       Basophil %   0.5     0.8       BILIRUBIN TOTAL   0.6     0.5       Bilirubin, UA Negative             BUN   10.1     10.9       Calcium   9.2     9.7       Chloride   108     105       Cholesterol       139       CO2   28     29       Color, UA Yellow             CPK MB   2.5           Creatinine   0.73     0.82       eGFR   >60     >60       Eos #   0.22     0.27       Eosinophil %   5.7     7.2       Estimated Avg Glucose   102.5           Globulin, Total   2.4     3.1       Glucose   97     121       Glucose, UA Negative             HDL       51       Hematocrit   35.1     38.4       Hemoglobin   11.5     12.5       Hemoglobin A1C External   5.2           Immature  Grans (Abs)   0.02     0.01       Immature Granulocytes   0.5     0.3       INR     1.1   1.1       Ketones, UA Negative             LDL Cholesterol External       71.00       Leukocytes, UA 1+             Lymph #   1.29     1.06       LYMPH %   33.4     28.3       Magnesium    2.20           MCH   28.8     28.9       MCHC   32.8     32.6       MCV   87.8     88.9       Mono #   0.40     0.30       Mono %   10.4     8.0       MPV   11.8     11.9       Neut #   1.91     2.07       Neut %   49.5     55.4       NITRITE UA Negative             nRBC   0.0     0.0       Occult Blood UA 1+             pH, UA 7.0             Phosphorus   3.1           Platelets   148     183       Potassium   3.4     3.3       PROTEIN TOTAL   6.0     6.9       Protein, UA Negative             Protime     14.0   13.8       RBC   4.00     4.32       RBC, UA 10             RDW   13.1     13.1       Sodium   145     143       Specific Gravity,UA 1.021             Squam Epithel, UA <5             Thyroid Stimulating Hormone       3.887       Total Cholesterol/HDL Ratio       3       Triglycerides       86       Urobilinogen, UA 1.0             Very Low Density Lipoprotein       17       WBC, UA 9             WBC   3.86     3.74               Significant Imaging: I have reviewed all pertinent imaging results/findings within the past 24 hours.

## 2023-09-26 NOTE — PLAN OF CARE
Problem: Adult Inpatient Plan of Care  Goal: Plan of Care Review  Outcome: Ongoing, Progressing  Flowsheets (Taken 9/26/2023 0759)  Plan of Care Reviewed With:   patient   spouse  Goal: Patient-Specific Goal (Individualized)  Outcome: Ongoing, Progressing  Flowsheets (Taken 9/26/2023 0759)  Anxieties, Fears or Concerns: d/c  Individualized Care Needs: neuro checks, NIH  Goal: Absence of Hospital-Acquired Illness or Injury  Outcome: Ongoing, Progressing  Goal: Optimal Comfort and Wellbeing  Outcome: Ongoing, Progressing  Goal: Readiness for Transition of Care  Outcome: Ongoing, Progressing     Problem: Infection  Goal: Absence of Infection Signs and Symptoms  Outcome: Ongoing, Progressing     Problem: Adjustment to Illness (Stroke, Ischemic/Transient Ischemic Attack)  Goal: Optimal Coping  Outcome: Ongoing, Progressing     Problem: Bowel Elimination Impaired (Stroke, Ischemic/Transient Ischemic Attack)  Goal: Effective Bowel Elimination  Outcome: Ongoing, Progressing     Problem: Cerebral Tissue Perfusion (Stroke, Ischemic/Transient Ischemic Attack)  Goal: Optimal Cerebral Tissue Perfusion  Outcome: Ongoing, Progressing     Problem: Cognitive Impairment (Stroke, Ischemic/Transient Ischemic Attack)  Goal: Optimal Cognitive Function  Outcome: Ongoing, Progressing     Problem: Communication Impairment (Stroke, Ischemic/Transient Ischemic Attack)  Goal: Improved Communication Skills  Outcome: Ongoing, Progressing     Problem: Functional Ability Impaired (Stroke, Ischemic/Transient Ischemic Attack)  Goal: Optimal Functional Ability  Outcome: Ongoing, Progressing     Problem: Respiratory Compromise (Stroke, Ischemic/Transient Ischemic Attack)  Goal: Effective Oxygenation and Ventilation  Outcome: Ongoing, Progressing     Problem: Sensorimotor Impairment (Stroke, Ischemic/Transient Ischemic Attack)  Goal: Improved Sensorimotor Function  Outcome: Ongoing, Progressing     Problem: Swallowing Impairment (Stroke,  Ischemic/Transient Ischemic Attack)  Goal: Optimal Eating and Swallowing without Aspiration  Outcome: Ongoing, Progressing     Problem: Urinary Elimination Impaired (Stroke, Ischemic/Transient Ischemic Attack)  Goal: Effective Urinary Elimination  Outcome: Ongoing, Progressing     Problem: Fall Injury Risk  Goal: Absence of Fall and Fall-Related Injury  Outcome: Ongoing, Progressing

## 2023-10-08 NOTE — DISCHARGE SUMMARY
"[facility] Hospital Medicine Discharge Summary    Admit Date: 9/25/2023  Discharge Date and Time: 10/8/61968:32 PM  Admitting Physician:  Team  Discharging Physician: Tana Bran MD.  Primary Care Physician: Td Bolden MD  Consults: Neurology    Discharge Diagnoses:  tia  MRI negative for acute infarct   Hypokalemia resolved   Essential hypertension  Hyperlipidemia  GERD   Please see November 26, 2023 note for hospital stay physical exam discharge diagnosis  Hospital Course:   Please see November 26, 2023 note for hospital stay physical exam discharge diagnosis  Pt was seen and examined on the day of discharge  Vitals:  VITAL SIGNS: 24 HRS MIN & MAX LAST   No data recorded 98.8 °F (37.1 °C)   No data recorded 125/80   No data recorded  88   No data recorded 18   No data recorded (!) 94 %       Physical Exam:  Please see November 26, 2023 note for hospital stay physical exam discharge diagnosis    Procedures Performed: No admission procedures for hospital encounter.     Significant Diagnostic Studies: See Full reports for all details    No results for input(s): "WBC", "RBC", "HGB", "HCT", "MCV", "MCH", "MCHC", "RDW", "PLT", "MPV", "GRAN", "LYMPH", "MONO", "BASO", "NRBC" in the last 168 hours.    No results for input(s): "NA", "K", "CL", "CO2", "ANIONGAP", "BUN", "CREATININE", "GLU", "CALCIUM", "PH", "MG", "ALBUMIN", "PROT", "ALKPHOS", "ALT", "AST", "BILITOT" in the last 168 hours.     Microbiology Results (last 7 days)       ** No results found for the last 168 hours. **             Echo Saline Bubble? Yes    Left Ventricle: The left ventricle is normal in size. Normal wall   thickness. Normal wall motion. There is normal systolic function with a   visually estimated ejection fraction of 55 - 60%. Grade I diastolic   dysfunction.    Right Ventricle: Normal right ventricular cavity size. Systolic   function is normal.    Aortic Valve: The aortic valve is a trileaflet " valve.    Mitral Valve: There is moderate bileaflet sclerosis. There is mild   stenosis. The mean pressure gradient across the mitral valve is 6 mmHg at   a heart rate of  bpm. There is trace regurgitation.    Tricuspid Valve: There is mild regurgitation.    IVC/SVC: Normal venous pressure at 3 mmHg.    Pericardium: There is a trivial effusion. No indication of cardiac   tamponade.  MRI Brain Without Contrast  Narrative: Technique:Multiplanar, multisequence magnetic resonance imaging of the brain was performed without intravenous contrast.    Comparison:Comparison is with study dated 2023-09-25 11:25:52.    Clinical history:Ams.    Findings:    Hemorrhage:No acute intracranial hemorrhage is identified.    Stroke:No abnormal signal is identified on the diffusion images to suggest acute infarct.    Extra axial spaces:The ventricles and sulci and basal cisterns all appear mildly prominent suggesting an element of global cerebral atrophy.    Cerebral, cerebellar, and brainstem parenchyma:Subtle periventricular white matter signal abnormalities are seen. The main consideration is small vessel ischemic changes in a patient of this age.    Calvarium:Within normal limits.    Visualized paranasal sinuses:There is mucoperiosteal thickening in the left frontal and bilateral maxillary, ethmoid and sphenoid sinuses. Correlate clinically for sinusitis.  Impression: Impression:    No acute intracranial process is identified. Details and findings as above.    No significant discrepancy with overnight report.    Electronically signed by: Curtis Elkins  Date:    09/26/2023  Time:    07:36         Medication List        CONTINUE taking these medications      amLODIPine 10 MG tablet  Commonly known as: NORVASC     aspirin 81 MG EC tablet  Commonly known as: ECOTRIN     ezetimibe 10 mg tablet  Commonly known as: ZETIA     metoprolol succinate 50 MG 24 hr tablet  Commonly known as: TOPROL-XL     omeprazole 40 MG capsule  Commonly known  as: PRILOSEC     ONE DAILY MULTIVITAMIN per tablet  Generic drug: multivitamin     rosuvastatin 40 MG Tab  Commonly known as: CRESTOR     VITAMIN C 500 MG tablet  Generic drug: ascorbic acid (vitamin C)     vitamin D 1000 units Tab  Commonly known as: VITAMIN D3     vitamin E 400 UNIT capsule               Explained in detail to the patient about the discharge plan, medications, and follow-up visits. Pt understands and agrees with the treatment plan  Discharge Disposition: Home-Health Care [facility]   Discharged Condition: stable  Diet-    Medications Per [place] med rec  Activities as tolerated   Follow-up Information       Td Bolden MD. Call .    Specialty: Family Medicine  Why: PT HAVE NOT SEEN DR SINCE 2017 AND NEED TO CALL AND REQUEST ANOTHER PCP.  Contact information:  [address]  [address]  [place] [address]  [phone]               Kiara Scott MD. Call .    Specialty: Internal Medicine  Why: SPOKE WITH YAMILETH; FAXED THE DISCHARGE SUMMARY AND OFFICE WILL CALL PATIENT WITH AN APPOINTMENT.  Contact information:  [address]  [address]  [place] [address]  [phone]               Anna Lozabai Follow up.    Specialties: Home Health Services, Home Therapy Services, Home Living Aide Services  Contact information:  [address]  [facility] [address] [phone]             Carlos Mar MD Follow up in 1 week(s).    Specialties: Neurology, Interventional Neurology  Why: follow-up with Dr. Mar in 1-2 weeks LM ON VM WITH PATIENT'S NUMBER SO OFFICE CAN CONTACT PATIENT AND ALSO LEFT NUMBER FOR [facility] FOR A CALL BACK.  Contact information:  [address]  [address]  [facility] [address] [phone]                           For further questions contact hospitalist office    Discharge time 33 minutes    For worsening symptoms, chest pain, shortness of breath, increased abdominal pain, high grade fever, stroke or stroke like symptoms, immediately go  to the nearest Emergency Room or call 911 as soon as possible.      Tana Segovia M.D on 10/8/2023. at 2:32 PM.

## 2023-10-30 ENCOUNTER — OFFICE VISIT (OUTPATIENT)
Dept: NEUROLOGY | Facility: CLINIC | Age: 78
End: 2023-10-30
Payer: MEDICARE

## 2023-10-30 VITALS
HEIGHT: 66 IN | BODY MASS INDEX: 31.5 KG/M2 | DIASTOLIC BLOOD PRESSURE: 96 MMHG | WEIGHT: 196 LBS | HEART RATE: 79 BPM | SYSTOLIC BLOOD PRESSURE: 179 MMHG

## 2023-10-30 DIAGNOSIS — G45.9 TIA (TRANSIENT ISCHEMIC ATTACK): Primary | ICD-10-CM

## 2023-10-30 DIAGNOSIS — M54.12 CERVICAL RADICULOPATHY: ICD-10-CM

## 2023-10-30 PROBLEM — K21.9 GASTROESOPHAGEAL REFLUX DISEASE: Status: ACTIVE | Noted: 2018-08-06

## 2023-10-30 PROBLEM — I10 HYPERTENSION, ESSENTIAL: Status: ACTIVE | Noted: 2017-01-11

## 2023-10-30 PROBLEM — M17.9 OSTEOARTHRITIS OF KNEE: Status: ACTIVE | Noted: 2023-10-30

## 2023-10-30 PROBLEM — F41.9 ANXIETY DISORDER, UNSPECIFIED: Status: ACTIVE | Noted: 2017-04-27

## 2023-10-30 PROBLEM — Z98.890 S/P RIGHT ROTATOR CUFF REPAIR: Status: ACTIVE | Noted: 2023-10-30

## 2023-10-30 PROBLEM — E66.9 OBESITY: Status: ACTIVE | Noted: 2023-10-30

## 2023-10-30 PROBLEM — Z98.890 HISTORY OF LUMBAR SURGERY: Status: ACTIVE | Noted: 2017-08-10

## 2023-10-30 PROCEDURE — 1101F PT FALLS ASSESS-DOCD LE1/YR: CPT | Mod: CPTII,S$GLB,, | Performed by: PSYCHIATRY & NEUROLOGY

## 2023-10-30 PROCEDURE — 3288F FALL RISK ASSESSMENT DOCD: CPT | Mod: CPTII,S$GLB,, | Performed by: PSYCHIATRY & NEUROLOGY

## 2023-10-30 PROCEDURE — 3080F PR MOST RECENT DIASTOLIC BLOOD PRESSURE >= 90 MM HG: ICD-10-PCS | Mod: CPTII,S$GLB,, | Performed by: PSYCHIATRY & NEUROLOGY

## 2023-10-30 PROCEDURE — 99214 PR OFFICE/OUTPT VISIT, EST, LEVL IV, 30-39 MIN: ICD-10-PCS | Mod: S$GLB,,, | Performed by: PSYCHIATRY & NEUROLOGY

## 2023-10-30 PROCEDURE — 3077F SYST BP >= 140 MM HG: CPT | Mod: CPTII,S$GLB,, | Performed by: PSYCHIATRY & NEUROLOGY

## 2023-10-30 PROCEDURE — 99214 OFFICE O/P EST MOD 30 MIN: CPT | Mod: S$GLB,,, | Performed by: PSYCHIATRY & NEUROLOGY

## 2023-10-30 PROCEDURE — 99999 PR PBB SHADOW E&M-EST. PATIENT-LVL III: CPT | Mod: PBBFAC,,, | Performed by: PSYCHIATRY & NEUROLOGY

## 2023-10-30 PROCEDURE — 3288F PR FALLS RISK ASSESSMENT DOCUMENTED: ICD-10-PCS | Mod: CPTII,S$GLB,, | Performed by: PSYCHIATRY & NEUROLOGY

## 2023-10-30 PROCEDURE — 3077F PR MOST RECENT SYSTOLIC BLOOD PRESSURE >= 140 MM HG: ICD-10-PCS | Mod: CPTII,S$GLB,, | Performed by: PSYCHIATRY & NEUROLOGY

## 2023-10-30 PROCEDURE — 1101F PR PT FALLS ASSESS DOC 0-1 FALLS W/OUT INJ PAST YR: ICD-10-PCS | Mod: CPTII,S$GLB,, | Performed by: PSYCHIATRY & NEUROLOGY

## 2023-10-30 PROCEDURE — 1159F MED LIST DOCD IN RCRD: CPT | Mod: CPTII,S$GLB,, | Performed by: PSYCHIATRY & NEUROLOGY

## 2023-10-30 PROCEDURE — 1159F PR MEDICATION LIST DOCUMENTED IN MEDICAL RECORD: ICD-10-PCS | Mod: CPTII,S$GLB,, | Performed by: PSYCHIATRY & NEUROLOGY

## 2023-10-30 PROCEDURE — 99999 PR PBB SHADOW E&M-EST. PATIENT-LVL III: ICD-10-PCS | Mod: PBBFAC,,, | Performed by: PSYCHIATRY & NEUROLOGY

## 2023-10-30 PROCEDURE — 3080F DIAST BP >= 90 MM HG: CPT | Mod: CPTII,S$GLB,, | Performed by: PSYCHIATRY & NEUROLOGY

## 2023-10-30 NOTE — PROGRESS NOTES
Neurology Office Visit      Minda Bright is a 78 y.o. female for hospital follow-up.     Pt admitted for CVA work-up after presenting with blurry vision. Seen as consult on 9/26/23. MRI and CT head neg. CTA with narrowing left posterior cerebral artery. Taking Zetia, Crestor, metoprolol and Amlodipine.  Continued blurry vision in right eye. Seen by Optho since hospital discharge. Reports sharp pain in neck with movement and frequent headaches.     Review of Systems   Eyes:  Positive for blurred vision.   Musculoskeletal:  Negative for falls.   Neurological:  Positive for headaches. Negative for dizziness, speech change, focal weakness, seizures, loss of consciousness and weakness.        Past Medical History:   Diagnosis Date    Anxiety disorder, unspecified     Asthma     COPD (chronic obstructive pulmonary disease)     Hyperlipidemia     Hypertension     Obesity, unspecified      Past Surgical History:   Procedure Laterality Date    BACK SURGERY      BREAST LUMPECTOMY Right     LEFT HEART CATHETERIZATION Left 5/3/2022    Procedure: CATHETERIZATION, HEART, LEFT;  Surgeon: Jaskaran Kevin MD;  Location: [facility] CATH LAB;  Service: Cardiology;  Laterality: Left;  LHC +/- PCI VIA RRA    ROTATOR CUFF REPAIR Right     TONSILLECTOMY      TOTAL KNEE ARTHROPLASTY Right      History reviewed. No pertinent family history.  Review of patient's allergies indicates:   Allergen Reactions    Arb-angiotensin receptor antagonist Swelling       Current Outpatient Medications:     amLODIPine (NORVASC) 10 MG tablet, Take 10 mg by mouth once daily., Disp: , Rfl:     ascorbic acid, vitamin C, (VITAMIN C) 500 MG tablet, Take 500 mg by mouth every evening., Disp: , Rfl:     aspirin (ECOTRIN) 81 MG EC tablet, Take 81 mg by mouth once daily., Disp: , Rfl:     ezetimibe (ZETIA) 10 mg tablet, Take 10 mg by mouth once daily., Disp: , Rfl:     metoprolol succinate (TOPROL-XL) 50 MG 24 hr tablet, Take 50 mg by mouth once daily., Disp: ,  Rfl:     multivitamin (ONE DAILY MULTIVITAMIN) per tablet, Take 1 tablet by mouth once daily., Disp: , Rfl:     omeprazole (PRILOSEC) 40 MG capsule, Take 40 mg by mouth once daily., Disp: , Rfl:     rosuvastatin (CRESTOR) 40 MG Tab, Take 10 mg by mouth every evening., Disp: , Rfl:     vitamin D (VITAMIN D3) 1000 units Tab, Take 1,000 Units by mouth every evening., Disp: , Rfl:     vitamin E 400 UNIT capsule, Take 400 Units by mouth once daily., Disp: , Rfl:   No current facility-administered medications for this visit.    Facility-Administered Medications Ordered in Other Visits:     diphenhydrAMINE capsule 50 mg, 50 mg, Oral, On Call Procedure, Jaskaran Kevin MD, 50 mg at 05/03/22 1340    sodium chloride 0.9% flush 10 mL, 10 mL, Intravenous, PRN, Jaskaran Kevin MD  Outpatient Medications Marked as Taking for the 10/30/23 encounter (Office Visit) with Carlos Mar MD   Medication Sig Dispense Refill    amLODIPine (NORVASC) 10 MG tablet Take 10 mg by mouth once daily.      ascorbic acid, vitamin C, (VITAMIN C) 500 MG tablet Take 500 mg by mouth every evening.      aspirin (ECOTRIN) 81 MG EC tablet Take 81 mg by mouth once daily.      ezetimibe (ZETIA) 10 mg tablet Take 10 mg by mouth once daily.      metoprolol succinate (TOPROL-XL) 50 MG 24 hr tablet Take 50 mg by mouth once daily.      multivitamin (ONE DAILY MULTIVITAMIN) per tablet Take 1 tablet by mouth once daily.      omeprazole (PRILOSEC) 40 MG capsule Take 40 mg by mouth once daily.      rosuvastatin (CRESTOR) 40 MG Tab Take 10 mg by mouth every evening.      vitamin D (VITAMIN D3) 1000 units Tab Take 1,000 Units by mouth every evening.      vitamin E 400 UNIT capsule Take 400 Units by mouth once daily.       Social History     Tobacco Use    Smoking status: Never    Smokeless tobacco: Never   Substance Use Topics    Alcohol use: Never    Drug use: Never         Vitals:    10/30/23 1100   BP: (!) 179/96   Pulse: 79     Physical Exam:  Gen  NAD  HEENT NC/AT  CV RRR  Resp effort wnl  GI soft  Ext no C/C/E  Neuro  AAOx4  Speech fluent, appropriate  EOMI, PERRLA,  Normal facial strength, sensation  Tongue and palate midline  Motor 5/5  Sensation intact  DTRs symmetric  Coord intact    Assessment:   Minda was seen today for transient ischemic attack.    Diagnoses and all orders for this visit:    TIA (transient ischemic attack)    Cervical radiculopathy      Plan:   Schedule for Angiogram  Gabapentin qhs      Chray Theodore M.D.  [facility] -3

## 2023-11-13 ENCOUNTER — TELEPHONE (OUTPATIENT)
Dept: NEUROLOGY | Facility: CLINIC | Age: 78
End: 2023-11-13
Payer: MEDICARE

## 2023-11-13 NOTE — TELEPHONE ENCOUNTER
Contacted pt to remind her of diagnostic cerebral angiogram that is scheduled for 11/30/2023. Reminded pt to have pre-op labs drawn 2-3 days prior. Pt will report to [facility] on either 11/27/2023 or 11/28/2023 to have labs drawn. Pt was advised that she will receive a call from the hospital on 11/29/2023 between 4:30-6:00 to notify her of arrival time & to provide any special instructions.   Pt verbalized understanding.

## 2024-01-01 PROBLEM — G45.9 TIA (TRANSIENT ISCHEMIC ATTACK): Status: RESOLVED | Noted: 2023-09-26 | Resolved: 2024-01-01

## (undated) DEVICE — SET ANGIO ACIST CVI ANGIOTOUCH

## (undated) DEVICE — GUIDEWIRE EMERALD .035IN 260CM

## (undated) DEVICE — KIT GLIDESHEATH SLEND 6FR 10CM

## (undated) DEVICE — PAD DEFIB RADIOLUCENT ADULT

## (undated) DEVICE — CANNULA NASAL ADULT

## (undated) DEVICE — CATH OPTITORQUE TIG 4.0 100 CM

## (undated) DEVICE — Device

## (undated) DEVICE — BAND TR COMP DEVICE REG 24CM